# Patient Record
Sex: FEMALE | Race: WHITE | NOT HISPANIC OR LATINO | Employment: OTHER | URBAN - NONMETROPOLITAN AREA
[De-identification: names, ages, dates, MRNs, and addresses within clinical notes are randomized per-mention and may not be internally consistent; named-entity substitution may affect disease eponyms.]

---

## 2017-03-23 ENCOUNTER — TELEPHONE (OUTPATIENT)
Dept: CARDIAC SURGERY | Facility: CLINIC | Age: 72
End: 2017-03-23

## 2020-11-18 ENCOUNTER — OUTSIDE FACILITY SERVICE (OUTPATIENT)
Dept: CARDIOLOGY | Facility: CLINIC | Age: 75
End: 2020-11-18

## 2020-11-18 PROCEDURE — 93010 ELECTROCARDIOGRAM REPORT: CPT | Performed by: INTERNAL MEDICINE

## 2021-10-13 ENCOUNTER — APPOINTMENT (OUTPATIENT)
Dept: GENERAL RADIOLOGY | Age: 76
DRG: 493 | End: 2021-10-13
Payer: MEDICARE

## 2021-10-13 ENCOUNTER — ANESTHESIA EVENT (OUTPATIENT)
Dept: OPERATING ROOM | Age: 76
DRG: 493 | End: 2021-10-13
Payer: MEDICARE

## 2021-10-13 ENCOUNTER — ANESTHESIA (OUTPATIENT)
Dept: OPERATING ROOM | Age: 76
DRG: 493 | End: 2021-10-13
Payer: MEDICARE

## 2021-10-13 ENCOUNTER — HOSPITAL ENCOUNTER (INPATIENT)
Age: 76
LOS: 1 days | Discharge: HOME | DRG: 493 | End: 2021-10-14
Attending: PEDIATRICS | Admitting: HOSPITALIST
Payer: MEDICARE

## 2021-10-13 VITALS — TEMPERATURE: 98.2 F | OXYGEN SATURATION: 100 % | SYSTOLIC BLOOD PRESSURE: 130 MMHG | DIASTOLIC BLOOD PRESSURE: 59 MMHG
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
## 2021-10-13 DIAGNOSIS — S82.202B TYPE I OR II OPEN FRACTURE OF LEFT TIBIA AND FIBULA, INITIAL ENCOUNTER: Primary | ICD-10-CM

## 2021-10-13 DIAGNOSIS — S82.402B TYPE I OR II OPEN FRACTURE OF LEFT TIBIA AND FIBULA, INITIAL ENCOUNTER: Primary | ICD-10-CM

## 2021-10-13 DIAGNOSIS — F31.9 BIPOLAR AFFECTIVE DISORDER, REMISSION STATUS UNSPECIFIED (HCC): ICD-10-CM

## 2021-10-13 DIAGNOSIS — N39.0 ACUTE URINARY TRACT INFECTION: ICD-10-CM

## 2021-10-13 PROBLEM — S82.832B: Status: ACTIVE | Noted: 2021-10-13

## 2021-10-13 PROBLEM — E66.9 OBESITY: Status: ACTIVE | Noted: 2021-10-13

## 2021-10-13 PROBLEM — S82.302B: Status: ACTIVE | Noted: 2021-10-13

## 2021-10-13 PROBLEM — I50.32 CHRONIC DIASTOLIC HF (HEART FAILURE) (HCC): Status: ACTIVE | Noted: 2021-10-13

## 2021-10-13 PROBLEM — I10 ESSENTIAL HYPERTENSION: Status: ACTIVE | Noted: 2021-10-13

## 2021-10-13 PROBLEM — G47.33 OBSTRUCTIVE SLEEP APNEA OF ADULT: Status: ACTIVE | Noted: 2021-10-13

## 2021-10-13 PROBLEM — J44.9 CHRONIC OBSTRUCTIVE LUNG DISEASE (HCC): Status: ACTIVE | Noted: 2021-10-13

## 2021-10-13 PROBLEM — N18.9 CKD (CHRONIC KIDNEY DISEASE): Status: ACTIVE | Noted: 2021-10-13

## 2021-10-13 LAB
ABO/RH: NORMAL
ALBUMIN SERPL-MCNC: 4 G/DL (ref 3.5–5.2)
ALP BLD-CCNC: 113 U/L (ref 35–104)
ALT SERPL-CCNC: 7 U/L (ref 5–33)
ANION GAP SERPL CALCULATED.3IONS-SCNC: 7 MMOL/L (ref 7–19)
ANION GAP SERPL CALCULATED.3IONS-SCNC: 9 MMOL/L (ref 7–19)
ANTIBODY SCREEN: NORMAL
APTT: 27.5 SEC (ref 26–36.2)
AST SERPL-CCNC: 12 U/L (ref 5–32)
BACTERIA: ABNORMAL /HPF
BASOPHILS ABSOLUTE: 0 K/UL (ref 0–0.2)
BASOPHILS RELATIVE PERCENT: 0.5 % (ref 0–1)
BILIRUB SERPL-MCNC: <0.2 MG/DL (ref 0.2–1.2)
BILIRUBIN URINE: NEGATIVE
BLOOD, URINE: NEGATIVE
BUN BLDV-MCNC: 27 MG/DL (ref 8–23)
BUN BLDV-MCNC: 27 MG/DL (ref 8–23)
CALCIUM SERPL-MCNC: 9.1 MG/DL (ref 8.8–10.2)
CALCIUM SERPL-MCNC: 9.1 MG/DL (ref 8.8–10.2)
CHLORIDE BLD-SCNC: 104 MMOL/L (ref 98–111)
CHLORIDE BLD-SCNC: 107 MMOL/L (ref 98–111)
CLARITY: ABNORMAL
CO2: 27 MMOL/L (ref 22–29)
CO2: 28 MMOL/L (ref 22–29)
COLOR: YELLOW
CREAT SERPL-MCNC: 1.6 MG/DL (ref 0.5–0.9)
CREAT SERPL-MCNC: 1.7 MG/DL (ref 0.5–0.9)
EOSINOPHILS ABSOLUTE: 0.3 K/UL (ref 0–0.6)
EOSINOPHILS RELATIVE PERCENT: 4.4 % (ref 0–5)
EPITHELIAL CELLS, UA: ABNORMAL /HPF
GFR AFRICAN AMERICAN: 36
GFR AFRICAN AMERICAN: 38
GFR NON-AFRICAN AMERICAN: 29
GFR NON-AFRICAN AMERICAN: 31
GLUCOSE BLD-MCNC: 110 MG/DL (ref 70–99)
GLUCOSE BLD-MCNC: 120 MG/DL (ref 74–109)
GLUCOSE BLD-MCNC: 136 MG/DL (ref 74–109)
GLUCOSE BLD-MCNC: 162 MG/DL (ref 70–99)
GLUCOSE BLD-MCNC: 273 MG/DL (ref 70–99)
GLUCOSE URINE: NEGATIVE MG/DL
HBA1C MFR BLD: 5.7 % (ref 4–6)
HCT VFR BLD CALC: 38.1 % (ref 37–47)
HEMOGLOBIN: 11.4 G/DL (ref 12–16)
IMMATURE GRANULOCYTES #: 0 K/UL
INR BLD: 0.94 (ref 0.88–1.18)
KETONES, URINE: ABNORMAL MG/DL
LEUKOCYTE ESTERASE, URINE: ABNORMAL
LYMPHOCYTES ABSOLUTE: 1.9 K/UL (ref 1.1–4.5)
LYMPHOCYTES RELATIVE PERCENT: 32.5 % (ref 20–40)
MCH RBC QN AUTO: 28.7 PG (ref 27–31)
MCHC RBC AUTO-ENTMCNC: 29.9 G/DL (ref 33–37)
MCV RBC AUTO: 96 FL (ref 81–99)
MONOCYTES ABSOLUTE: 0.5 K/UL (ref 0–0.9)
MONOCYTES RELATIVE PERCENT: 8.1 % (ref 0–10)
NEUTROPHILS ABSOLUTE: 3.2 K/UL (ref 1.5–7.5)
NEUTROPHILS RELATIVE PERCENT: 53.8 % (ref 50–65)
NITRITE, URINE: POSITIVE
PDW BLD-RTO: 13 % (ref 11.5–14.5)
PERFORMED ON: ABNORMAL
PH UA: 5 (ref 5–8)
PLATELET # BLD: 211 K/UL (ref 130–400)
PMV BLD AUTO: 9.4 FL (ref 9.4–12.3)
POTASSIUM REFLEX MAGNESIUM: 4.4 MMOL/L (ref 3.5–5)
POTASSIUM REFLEX MAGNESIUM: 4.5 MMOL/L (ref 3.5–5)
PROTEIN UA: ABNORMAL MG/DL
PROTHROMBIN TIME: 12.8 SEC (ref 12–14.6)
RBC # BLD: 3.97 M/UL (ref 4.2–5.4)
RBC UA: ABNORMAL /HPF (ref 0–2)
SARS-COV-2, NAAT: NOT DETECTED
SODIUM BLD-SCNC: 139 MMOL/L (ref 136–145)
SODIUM BLD-SCNC: 143 MMOL/L (ref 136–145)
SPECIFIC GRAVITY UA: 1.03 (ref 1–1.03)
TOTAL PROTEIN: 7.3 G/DL (ref 6.6–8.7)
TROPONIN: <0.01 NG/ML (ref 0–0.03)
UROBILINOGEN, URINE: 1 E.U./DL
VITAMIN D 25-HYDROXY: 66.6 NG/ML
WBC # BLD: 5.9 K/UL (ref 4.8–10.8)
WBC UA: ABNORMAL /HPF (ref 0–5)

## 2021-10-13 PROCEDURE — 99285 EMERGENCY DEPT VISIT HI MDM: CPT

## 2021-10-13 PROCEDURE — 64445 NJX AA&/STRD SCIATIC NRV IMG: CPT

## 2021-10-13 PROCEDURE — C1713 ANCHOR/SCREW BN/BN,TIS/BN: HCPCS | Performed by: ORTHOPAEDIC SURGERY

## 2021-10-13 PROCEDURE — 85610 PROTHROMBIN TIME: CPT

## 2021-10-13 PROCEDURE — 2580000003 HC RX 258: Performed by: HOSPITALIST

## 2021-10-13 PROCEDURE — 73600 X-RAY EXAM OF ANKLE: CPT

## 2021-10-13 PROCEDURE — 2580000003 HC RX 258

## 2021-10-13 PROCEDURE — 6360000002 HC RX W HCPCS: Performed by: PEDIATRICS

## 2021-10-13 PROCEDURE — 86901 BLOOD TYPING SEROLOGIC RH(D): CPT

## 2021-10-13 PROCEDURE — 82947 ASSAY GLUCOSE BLOOD QUANT: CPT

## 2021-10-13 PROCEDURE — 64447 NJX AA&/STRD FEMORAL NRV IMG: CPT

## 2021-10-13 PROCEDURE — 0QSH04Z REPOSITION LEFT TIBIA WITH INTERNAL FIXATION DEVICE, OPEN APPROACH: ICD-10-PCS | Performed by: ORTHOPAEDIC SURGERY

## 2021-10-13 PROCEDURE — 73610 X-RAY EXAM OF ANKLE: CPT

## 2021-10-13 PROCEDURE — 87186 SC STD MICRODIL/AGAR DIL: CPT

## 2021-10-13 PROCEDURE — 2500000003 HC RX 250 WO HCPCS: Performed by: PEDIATRICS

## 2021-10-13 PROCEDURE — 6360000002 HC RX W HCPCS: Performed by: ORTHOPAEDIC SURGERY

## 2021-10-13 PROCEDURE — 73630 X-RAY EXAM OF FOOT: CPT

## 2021-10-13 PROCEDURE — 7100000000 HC PACU RECOVERY - FIRST 15 MIN: Performed by: ORTHOPAEDIC SURGERY

## 2021-10-13 PROCEDURE — C1769 GUIDE WIRE: HCPCS | Performed by: ORTHOPAEDIC SURGERY

## 2021-10-13 PROCEDURE — 6360000002 HC RX W HCPCS

## 2021-10-13 PROCEDURE — 3700000001 HC ADD 15 MINUTES (ANESTHESIA): Performed by: ORTHOPAEDIC SURGERY

## 2021-10-13 PROCEDURE — 93005 ELECTROCARDIOGRAM TRACING: CPT | Performed by: PEDIATRICS

## 2021-10-13 PROCEDURE — 6360000002 HC RX W HCPCS: Performed by: ANESTHESIOLOGY

## 2021-10-13 PROCEDURE — 2500000003 HC RX 250 WO HCPCS

## 2021-10-13 PROCEDURE — 84484 ASSAY OF TROPONIN QUANT: CPT

## 2021-10-13 PROCEDURE — 90471 IMMUNIZATION ADMIN: CPT | Performed by: PEDIATRICS

## 2021-10-13 PROCEDURE — 82306 VITAMIN D 25 HYDROXY: CPT

## 2021-10-13 PROCEDURE — 87635 SARS-COV-2 COVID-19 AMP PRB: CPT

## 2021-10-13 PROCEDURE — 94640 AIRWAY INHALATION TREATMENT: CPT

## 2021-10-13 PROCEDURE — 2580000003 HC RX 258: Performed by: ORTHOPAEDIC SURGERY

## 2021-10-13 PROCEDURE — 6370000000 HC RX 637 (ALT 250 FOR IP): Performed by: ORTHOPAEDIC SURGERY

## 2021-10-13 PROCEDURE — 96374 THER/PROPH/DIAG INJ IV PUSH: CPT

## 2021-10-13 PROCEDURE — 2720000010 HC SURG SUPPLY STERILE: Performed by: ORTHOPAEDIC SURGERY

## 2021-10-13 PROCEDURE — 36415 COLL VENOUS BLD VENIPUNCTURE: CPT

## 2021-10-13 PROCEDURE — 87040 BLOOD CULTURE FOR BACTERIA: CPT

## 2021-10-13 PROCEDURE — 3600000014 HC SURGERY LEVEL 4 ADDTL 15MIN: Performed by: ORTHOPAEDIC SURGERY

## 2021-10-13 PROCEDURE — 2580000003 HC RX 258: Performed by: PEDIATRICS

## 2021-10-13 PROCEDURE — 81001 URINALYSIS AUTO W/SCOPE: CPT

## 2021-10-13 PROCEDURE — 96375 TX/PRO/DX INJ NEW DRUG ADDON: CPT

## 2021-10-13 PROCEDURE — 83036 HEMOGLOBIN GLYCOSYLATED A1C: CPT

## 2021-10-13 PROCEDURE — 80053 COMPREHEN METABOLIC PANEL: CPT

## 2021-10-13 PROCEDURE — 99152 MOD SED SAME PHYS/QHP 5/>YRS: CPT

## 2021-10-13 PROCEDURE — 27752 TREATMENT OF TIBIA FRACTURE: CPT

## 2021-10-13 PROCEDURE — 2709999900 HC NON-CHARGEABLE SUPPLY: Performed by: ORTHOPAEDIC SURGERY

## 2021-10-13 PROCEDURE — 86850 RBC ANTIBODY SCREEN: CPT

## 2021-10-13 PROCEDURE — 87086 URINE CULTURE/COLONY COUNT: CPT

## 2021-10-13 PROCEDURE — 71045 X-RAY EXAM CHEST 1 VIEW: CPT

## 2021-10-13 PROCEDURE — 3E0T3BZ INTRODUCTION OF ANESTHETIC AGENT INTO PERIPHERAL NERVES AND PLEXI, PERCUTANEOUS APPROACH: ICD-10-PCS | Performed by: ORTHOPAEDIC SURGERY

## 2021-10-13 PROCEDURE — 85730 THROMBOPLASTIN TIME PARTIAL: CPT

## 2021-10-13 PROCEDURE — 6360000002 HC RX W HCPCS: Performed by: HOSPITALIST

## 2021-10-13 PROCEDURE — 3600000004 HC SURGERY LEVEL 4 BASE: Performed by: ORTHOPAEDIC SURGERY

## 2021-10-13 PROCEDURE — 6360000002 HC RX W HCPCS: Performed by: STUDENT IN AN ORGANIZED HEALTH CARE EDUCATION/TRAINING PROGRAM

## 2021-10-13 PROCEDURE — 0QSK04Z REPOSITION LEFT FIBULA WITH INTERNAL FIXATION DEVICE, OPEN APPROACH: ICD-10-PCS | Performed by: ORTHOPAEDIC SURGERY

## 2021-10-13 PROCEDURE — 2700000000 HC OXYGEN THERAPY PER DAY

## 2021-10-13 PROCEDURE — 2580000003 HC RX 258: Performed by: STUDENT IN AN ORGANIZED HEALTH CARE EDUCATION/TRAINING PROGRAM

## 2021-10-13 PROCEDURE — 85025 COMPLETE CBC W/AUTO DIFF WBC: CPT

## 2021-10-13 PROCEDURE — 6370000000 HC RX 637 (ALT 250 FOR IP): Performed by: HOSPITALIST

## 2021-10-13 PROCEDURE — 3209999900 FLUORO FOR SURGICAL PROCEDURES

## 2021-10-13 PROCEDURE — 86900 BLOOD TYPING SEROLOGIC ABO: CPT

## 2021-10-13 PROCEDURE — 3700000000 HC ANESTHESIA ATTENDED CARE: Performed by: ORTHOPAEDIC SURGERY

## 2021-10-13 PROCEDURE — 7100000001 HC PACU RECOVERY - ADDTL 15 MIN: Performed by: ORTHOPAEDIC SURGERY

## 2021-10-13 PROCEDURE — 90715 TDAP VACCINE 7 YRS/> IM: CPT | Performed by: PEDIATRICS

## 2021-10-13 PROCEDURE — 51702 INSERT TEMP BLADDER CATH: CPT

## 2021-10-13 PROCEDURE — 1210000000 HC MED SURG R&B

## 2021-10-13 DEVICE — SCREW BNE L16MM DIA3.5MM CORT S STL ST NONCANNULATED LOK: Type: IMPLANTABLE DEVICE | Site: ANKLE | Status: FUNCTIONAL

## 2021-10-13 DEVICE — SCREW BNE L40MM DIA4MM S STL CANN SHT 1/3 THRD SM HEX SOCK: Type: IMPLANTABLE DEVICE | Site: ANKLE | Status: FUNCTIONAL

## 2021-10-13 DEVICE — PLATE BNE L117MM 10 H S STL 1/3 TBLR LOK COMPR W/ CLLR FOR: Type: IMPLANTABLE DEVICE | Site: ANKLE | Status: FUNCTIONAL

## 2021-10-13 DEVICE — IMPLANTABLE DEVICE: Type: IMPLANTABLE DEVICE | Site: ANKLE | Status: FUNCTIONAL

## 2021-10-13 DEVICE — SCREW BNE L14MM DIA3.5MM CORT S STL ST LOK FULL THRD: Type: IMPLANTABLE DEVICE | Site: ANKLE | Status: FUNCTIONAL

## 2021-10-13 DEVICE — SCREW BNE L14MM DIA3.5MM CORT S STL ST NONCANNULATED LOK: Type: IMPLANTABLE DEVICE | Site: ANKLE | Status: FUNCTIONAL

## 2021-10-13 RX ORDER — LAMOTRIGINE 200 MG/1
200 TABLET ORAL 2 TIMES DAILY
COMMUNITY

## 2021-10-13 RX ORDER — ROPIVACAINE HYDROCHLORIDE 5 MG/ML
INJECTION, SOLUTION EPIDURAL; INFILTRATION; PERINEURAL
Status: COMPLETED
Start: 2021-10-13 | End: 2021-10-13

## 2021-10-13 RX ORDER — ESCITALOPRAM OXALATE 10 MG/1
30 TABLET ORAL DAILY
Status: DISCONTINUED | OUTPATIENT
Start: 2021-10-13 | End: 2021-10-14 | Stop reason: HOSPADM

## 2021-10-13 RX ORDER — ANTIARTHRITIC COMBINATION NO.2 900 MG
TABLET ORAL DAILY
COMMUNITY

## 2021-10-13 RX ORDER — DEXTROSE MONOHYDRATE, SODIUM CHLORIDE, AND POTASSIUM CHLORIDE 50; 1.49; 4.5 G/1000ML; G/1000ML; G/1000ML
INJECTION, SOLUTION INTRAVENOUS CONTINUOUS
Status: DISCONTINUED | OUTPATIENT
Start: 2021-10-13 | End: 2021-10-13

## 2021-10-13 RX ORDER — DEXTROSE MONOHYDRATE 25 G/50ML
12.5 INJECTION, SOLUTION INTRAVENOUS PRN
Status: DISCONTINUED | OUTPATIENT
Start: 2021-10-13 | End: 2021-10-14 | Stop reason: HOSPADM

## 2021-10-13 RX ORDER — ONDANSETRON 2 MG/ML
4 INJECTION INTRAMUSCULAR; INTRAVENOUS EVERY 6 HOURS PRN
Status: DISCONTINUED | OUTPATIENT
Start: 2021-10-13 | End: 2021-10-14 | Stop reason: HOSPADM

## 2021-10-13 RX ORDER — HYDROMORPHONE HYDROCHLORIDE 1 MG/ML
2 INJECTION, SOLUTION INTRAMUSCULAR; INTRAVENOUS; SUBCUTANEOUS
Status: DISCONTINUED | OUTPATIENT
Start: 2021-10-13 | End: 2021-10-14 | Stop reason: HOSPADM

## 2021-10-13 RX ORDER — NITROGLYCERIN 20 MG/100ML
INJECTION INTRAVENOUS PRN
Status: DISCONTINUED | OUTPATIENT
Start: 2021-10-13 | End: 2021-10-13 | Stop reason: SDUPTHER

## 2021-10-13 RX ORDER — CLONAZEPAM 0.5 MG/1
0.5 TABLET ORAL NIGHTLY
Status: ON HOLD | COMMUNITY
End: 2021-10-14 | Stop reason: SDUPTHER

## 2021-10-13 RX ORDER — SODIUM CHLORIDE 9 MG/ML
25 INJECTION, SOLUTION INTRAVENOUS PRN
Status: DISCONTINUED | OUTPATIENT
Start: 2021-10-13 | End: 2021-10-13

## 2021-10-13 RX ORDER — FENTANYL CITRATE 50 UG/ML
INJECTION, SOLUTION INTRAMUSCULAR; INTRAVENOUS PRN
Status: DISCONTINUED | OUTPATIENT
Start: 2021-10-13 | End: 2021-10-13 | Stop reason: SDUPTHER

## 2021-10-13 RX ORDER — MAGNESIUM OXIDE 400 MG/1
400 TABLET ORAL DAILY
COMMUNITY

## 2021-10-13 RX ORDER — DEXTROSE MONOHYDRATE 50 MG/ML
100 INJECTION, SOLUTION INTRAVENOUS PRN
Status: DISCONTINUED | OUTPATIENT
Start: 2021-10-13 | End: 2021-10-14 | Stop reason: HOSPADM

## 2021-10-13 RX ORDER — SODIUM CHLORIDE 0.9 % (FLUSH) 0.9 %
5-40 SYRINGE (ML) INJECTION PRN
Status: DISCONTINUED | OUTPATIENT
Start: 2021-10-13 | End: 2021-10-14 | Stop reason: HOSPADM

## 2021-10-13 RX ORDER — BUDESONIDE 0.5 MG/2ML
0.5 INHALANT ORAL
Status: DISCONTINUED | OUTPATIENT
Start: 2021-10-13 | End: 2021-10-14 | Stop reason: HOSPADM

## 2021-10-13 RX ORDER — SODIUM CHLORIDE 9 MG/ML
INJECTION, SOLUTION INTRAVENOUS CONTINUOUS
Status: DISCONTINUED | OUTPATIENT
Start: 2021-10-13 | End: 2021-10-13

## 2021-10-13 RX ORDER — ONDANSETRON 2 MG/ML
4 INJECTION INTRAMUSCULAR; INTRAVENOUS
Status: DISCONTINUED | OUTPATIENT
Start: 2021-10-13 | End: 2021-10-13 | Stop reason: HOSPADM

## 2021-10-13 RX ORDER — MORPHINE SULFATE 2 MG/ML
2 INJECTION, SOLUTION INTRAMUSCULAR; INTRAVENOUS
Status: DISCONTINUED | OUTPATIENT
Start: 2021-10-13 | End: 2021-10-13

## 2021-10-13 RX ORDER — POTASSIUM CHLORIDE 20 MEQ/1
20 TABLET, EXTENDED RELEASE ORAL 2 TIMES DAILY
Status: DISCONTINUED | OUTPATIENT
Start: 2021-10-13 | End: 2021-10-14 | Stop reason: HOSPADM

## 2021-10-13 RX ORDER — CALCIUM CARBONATE 500 MG/1
500 TABLET, CHEWABLE ORAL 3 TIMES DAILY PRN
Status: DISCONTINUED | OUTPATIENT
Start: 2021-10-13 | End: 2021-10-14 | Stop reason: HOSPADM

## 2021-10-13 RX ORDER — ONDANSETRON 2 MG/ML
INJECTION INTRAMUSCULAR; INTRAVENOUS PRN
Status: DISCONTINUED | OUTPATIENT
Start: 2021-10-13 | End: 2021-10-13 | Stop reason: SDUPTHER

## 2021-10-13 RX ORDER — TOPIRAMATE 50 MG/1
50 TABLET, FILM COATED ORAL 2 TIMES DAILY
COMMUNITY

## 2021-10-13 RX ORDER — HYDROMORPHONE HYDROCHLORIDE 1 MG/ML
0.25 INJECTION, SOLUTION INTRAMUSCULAR; INTRAVENOUS; SUBCUTANEOUS EVERY 5 MIN PRN
Status: DISCONTINUED | OUTPATIENT
Start: 2021-10-13 | End: 2021-10-13 | Stop reason: HOSPADM

## 2021-10-13 RX ORDER — PANTOPRAZOLE SODIUM 40 MG/1
40 TABLET, DELAYED RELEASE ORAL 2 TIMES DAILY
Status: DISCONTINUED | OUTPATIENT
Start: 2021-10-13 | End: 2021-10-14 | Stop reason: HOSPADM

## 2021-10-13 RX ORDER — ANTIARTHRITIC COMBINATION NO.2 900 MG
1 TABLET ORAL DAILY
Status: DISCONTINUED | OUTPATIENT
Start: 2021-10-13 | End: 2021-10-13

## 2021-10-13 RX ORDER — TRAMADOL HYDROCHLORIDE 50 MG/1
50 TABLET ORAL EVERY 6 HOURS
Status: DISCONTINUED | OUTPATIENT
Start: 2021-10-13 | End: 2021-10-14 | Stop reason: HOSPADM

## 2021-10-13 RX ORDER — LAMOTRIGINE 200 MG/1
200 TABLET ORAL 2 TIMES DAILY
Status: DISCONTINUED | OUTPATIENT
Start: 2021-10-13 | End: 2021-10-14 | Stop reason: HOSPADM

## 2021-10-13 RX ORDER — ROPIVACAINE HYDROCHLORIDE 5 MG/ML
INJECTION, SOLUTION EPIDURAL; INFILTRATION; PERINEURAL
Status: COMPLETED | OUTPATIENT
Start: 2021-10-13 | End: 2021-10-13

## 2021-10-13 RX ORDER — MORPHINE SULFATE 2 MG/ML
1 INJECTION, SOLUTION INTRAMUSCULAR; INTRAVENOUS
Status: DISCONTINUED | OUTPATIENT
Start: 2021-10-13 | End: 2021-10-13

## 2021-10-13 RX ORDER — MECOBALAMIN 5000 MCG
5 TABLET,DISINTEGRATING ORAL NIGHTLY PRN
Status: DISCONTINUED | OUTPATIENT
Start: 2021-10-13 | End: 2021-10-13

## 2021-10-13 RX ORDER — ALBUTEROL SULFATE 2.5 MG/3ML
2.5 SOLUTION RESPIRATORY (INHALATION)
Status: DISCONTINUED | OUTPATIENT
Start: 2021-10-13 | End: 2021-10-14 | Stop reason: HOSPADM

## 2021-10-13 RX ORDER — DEXAMETHASONE SODIUM PHOSPHATE 4 MG/ML
INJECTION, SOLUTION INTRA-ARTICULAR; INTRALESIONAL; INTRAMUSCULAR; INTRAVENOUS; SOFT TISSUE PRN
Status: DISCONTINUED | OUTPATIENT
Start: 2021-10-13 | End: 2021-10-13 | Stop reason: SDUPTHER

## 2021-10-13 RX ORDER — SODIUM CHLORIDE 0.9 % (FLUSH) 0.9 %
10 SYRINGE (ML) INJECTION PRN
Status: DISCONTINUED | OUTPATIENT
Start: 2021-10-13 | End: 2021-10-13 | Stop reason: HOSPADM

## 2021-10-13 RX ORDER — ONDANSETRON 4 MG/1
4 TABLET, ORALLY DISINTEGRATING ORAL EVERY 8 HOURS PRN
Status: DISCONTINUED | OUTPATIENT
Start: 2021-10-13 | End: 2021-10-14 | Stop reason: HOSPADM

## 2021-10-13 RX ORDER — FENTANYL CITRATE 50 UG/ML
50 INJECTION, SOLUTION INTRAMUSCULAR; INTRAVENOUS EVERY 5 MIN PRN
Status: DISCONTINUED | OUTPATIENT
Start: 2021-10-13 | End: 2021-10-13 | Stop reason: HOSPADM

## 2021-10-13 RX ORDER — METOPROLOL TARTRATE 50 MG/1
25 TABLET, FILM COATED ORAL 2 TIMES DAILY
Status: DISCONTINUED | OUTPATIENT
Start: 2021-10-13 | End: 2021-10-14 | Stop reason: HOSPADM

## 2021-10-13 RX ORDER — SODIUM CHLORIDE 9 MG/ML
25 INJECTION, SOLUTION INTRAVENOUS PRN
Status: DISCONTINUED | OUTPATIENT
Start: 2021-10-13 | End: 2021-10-13 | Stop reason: HOSPADM

## 2021-10-13 RX ORDER — ONDANSETRON 2 MG/ML
4 INJECTION INTRAMUSCULAR; INTRAVENOUS ONCE
Status: COMPLETED | OUTPATIENT
Start: 2021-10-13 | End: 2021-10-13

## 2021-10-13 RX ORDER — TOPIRAMATE 50 MG/1
50 TABLET, FILM COATED ORAL 2 TIMES DAILY
Status: DISCONTINUED | OUTPATIENT
Start: 2021-10-13 | End: 2021-10-14 | Stop reason: HOSPADM

## 2021-10-13 RX ORDER — MORPHINE SULFATE 2 MG/ML
1 INJECTION, SOLUTION INTRAMUSCULAR; INTRAVENOUS EVERY 4 HOURS PRN
Status: DISCONTINUED | OUTPATIENT
Start: 2021-10-13 | End: 2021-10-13

## 2021-10-13 RX ORDER — ALBUTEROL SULFATE 90 UG/1
INHALANT RESPIRATORY (INHALATION)
COMMUNITY

## 2021-10-13 RX ORDER — SODIUM CHLORIDE 0.9 % (FLUSH) 0.9 %
5-40 SYRINGE (ML) INJECTION EVERY 12 HOURS SCHEDULED
Status: DISCONTINUED | OUTPATIENT
Start: 2021-10-13 | End: 2021-10-14 | Stop reason: HOSPADM

## 2021-10-13 RX ORDER — POTASSIUM CHLORIDE 1500 MG/1
20 TABLET, EXTENDED RELEASE ORAL 2 TIMES DAILY
COMMUNITY

## 2021-10-13 RX ORDER — LANOLIN ALCOHOL/MO/W.PET/CERES
400 CREAM (GRAM) TOPICAL DAILY
Status: DISCONTINUED | OUTPATIENT
Start: 2021-10-13 | End: 2021-10-14 | Stop reason: HOSPADM

## 2021-10-13 RX ORDER — TAMSULOSIN HYDROCHLORIDE 0.4 MG/1
0.4 CAPSULE ORAL DAILY
COMMUNITY

## 2021-10-13 RX ORDER — CALCIUM CARBONATE 500 MG/1
500 TABLET, CHEWABLE ORAL DAILY
Status: DISCONTINUED | OUTPATIENT
Start: 2021-10-13 | End: 2021-10-14 | Stop reason: HOSPADM

## 2021-10-13 RX ORDER — HYDROMORPHONE HYDROCHLORIDE 1 MG/ML
0.5 INJECTION, SOLUTION INTRAMUSCULAR; INTRAVENOUS; SUBCUTANEOUS
Status: DISCONTINUED | OUTPATIENT
Start: 2021-10-13 | End: 2021-10-14 | Stop reason: HOSPADM

## 2021-10-13 RX ORDER — HYDROMORPHONE HYDROCHLORIDE 1 MG/ML
1 INJECTION, SOLUTION INTRAMUSCULAR; INTRAVENOUS; SUBCUTANEOUS
Status: DISCONTINUED | OUTPATIENT
Start: 2021-10-13 | End: 2021-10-14 | Stop reason: HOSPADM

## 2021-10-13 RX ORDER — CEFAZOLIN SODIUM 1 G/3ML
INJECTION, POWDER, FOR SOLUTION INTRAMUSCULAR; INTRAVENOUS PRN
Status: DISCONTINUED | OUTPATIENT
Start: 2021-10-13 | End: 2021-10-13 | Stop reason: SDUPTHER

## 2021-10-13 RX ORDER — PANTOPRAZOLE SODIUM 40 MG/1
40 TABLET, DELAYED RELEASE ORAL 2 TIMES DAILY
COMMUNITY

## 2021-10-13 RX ORDER — FENTANYL CITRATE 50 UG/ML
INJECTION, SOLUTION INTRAMUSCULAR; INTRAVENOUS
Status: COMPLETED
Start: 2021-10-13 | End: 2021-10-13

## 2021-10-13 RX ORDER — SODIUM CHLORIDE, SODIUM LACTATE, POTASSIUM CHLORIDE, CALCIUM CHLORIDE 600; 310; 30; 20 MG/100ML; MG/100ML; MG/100ML; MG/100ML
INJECTION, SOLUTION INTRAVENOUS CONTINUOUS
Status: DISCONTINUED | OUTPATIENT
Start: 2021-10-13 | End: 2021-10-13

## 2021-10-13 RX ORDER — MECOBALAMIN 5000 MCG
5 TABLET,DISINTEGRATING ORAL NIGHTLY PRN
Status: DISCONTINUED | OUTPATIENT
Start: 2021-10-13 | End: 2021-10-14 | Stop reason: HOSPADM

## 2021-10-13 RX ORDER — POLYETHYLENE GLYCOL 3350 17 G/17G
17 POWDER, FOR SOLUTION ORAL DAILY PRN
COMMUNITY

## 2021-10-13 RX ORDER — VITAMIN B COMPLEX
2000 TABLET ORAL DAILY
Status: DISCONTINUED | OUTPATIENT
Start: 2021-10-13 | End: 2021-10-14 | Stop reason: HOSPADM

## 2021-10-13 RX ORDER — DOCUSATE SODIUM 100 MG/1
100 CAPSULE, LIQUID FILLED ORAL DAILY
COMMUNITY

## 2021-10-13 RX ORDER — NITROGLYCERIN 0.4 MG/1
0.4 TABLET SUBLINGUAL EVERY 5 MIN PRN
COMMUNITY

## 2021-10-13 RX ORDER — SODIUM CHLORIDE 0.9 % (FLUSH) 0.9 %
10 SYRINGE (ML) INJECTION EVERY 12 HOURS SCHEDULED
Status: DISCONTINUED | OUTPATIENT
Start: 2021-10-13 | End: 2021-10-13 | Stop reason: HOSPADM

## 2021-10-13 RX ORDER — OXYCODONE HYDROCHLORIDE 5 MG/1
10 TABLET ORAL EVERY 4 HOURS PRN
Status: DISCONTINUED | OUTPATIENT
Start: 2021-10-13 | End: 2021-10-14 | Stop reason: HOSPADM

## 2021-10-13 RX ORDER — SODIUM CHLORIDE, SODIUM LACTATE, POTASSIUM CHLORIDE, CALCIUM CHLORIDE 600; 310; 30; 20 MG/100ML; MG/100ML; MG/100ML; MG/100ML
INJECTION, SOLUTION INTRAVENOUS CONTINUOUS PRN
Status: DISCONTINUED | OUTPATIENT
Start: 2021-10-13 | End: 2021-10-13 | Stop reason: SDUPTHER

## 2021-10-13 RX ORDER — FENTANYL CITRATE 50 UG/ML
100 INJECTION, SOLUTION INTRAMUSCULAR; INTRAVENOUS ONCE
Status: COMPLETED | OUTPATIENT
Start: 2021-10-13 | End: 2021-10-13

## 2021-10-13 RX ORDER — DOCUSATE SODIUM 100 MG/1
100 CAPSULE, LIQUID FILLED ORAL DAILY
Status: DISCONTINUED | OUTPATIENT
Start: 2021-10-13 | End: 2021-10-14 | Stop reason: HOSPADM

## 2021-10-13 RX ORDER — ARFORMOTEROL TARTRATE 15 UG/2ML
15 SOLUTION RESPIRATORY (INHALATION)
Status: DISCONTINUED | OUTPATIENT
Start: 2021-10-13 | End: 2021-10-14 | Stop reason: HOSPADM

## 2021-10-13 RX ORDER — TAMSULOSIN HYDROCHLORIDE 0.4 MG/1
0.4 CAPSULE ORAL DAILY
Status: DISCONTINUED | OUTPATIENT
Start: 2021-10-13 | End: 2021-10-14 | Stop reason: HOSPADM

## 2021-10-13 RX ORDER — CALCIUM CARBONATE 500 MG/1
1 TABLET, CHEWABLE ORAL EVERY 6 HOURS PRN
COMMUNITY

## 2021-10-13 RX ORDER — ACETAMINOPHEN 325 MG/1
650 TABLET ORAL EVERY 4 HOURS PRN
COMMUNITY

## 2021-10-13 RX ORDER — BUMETANIDE 1 MG/1
1.5 TABLET ORAL DAILY
COMMUNITY

## 2021-10-13 RX ORDER — ZOLPIDEM TARTRATE 10 MG/1
TABLET ORAL NIGHTLY
Status: ON HOLD | COMMUNITY
End: 2021-10-14 | Stop reason: HOSPADM

## 2021-10-13 RX ORDER — POLYETHYLENE GLYCOL 3350 17 G/17G
17 POWDER, FOR SOLUTION ORAL DAILY PRN
Status: DISCONTINUED | OUTPATIENT
Start: 2021-10-13 | End: 2021-10-14 | Stop reason: HOSPADM

## 2021-10-13 RX ORDER — ROCURONIUM BROMIDE 10 MG/ML
INJECTION, SOLUTION INTRAVENOUS PRN
Status: DISCONTINUED | OUTPATIENT
Start: 2021-10-13 | End: 2021-10-13 | Stop reason: SDUPTHER

## 2021-10-13 RX ORDER — NICOTINE POLACRILEX 4 MG
15 LOZENGE BUCCAL PRN
Status: DISCONTINUED | OUTPATIENT
Start: 2021-10-13 | End: 2021-10-14 | Stop reason: HOSPADM

## 2021-10-13 RX ORDER — PROPOFOL 10 MG/ML
INJECTION, EMULSION INTRAVENOUS PRN
Status: DISCONTINUED | OUTPATIENT
Start: 2021-10-13 | End: 2021-10-13 | Stop reason: SDUPTHER

## 2021-10-13 RX ORDER — ERGOCALCIFEROL 1.25 MG/1
50000 CAPSULE ORAL WEEKLY
Status: ON HOLD | COMMUNITY
End: 2021-10-13

## 2021-10-13 RX ORDER — OXYCODONE HYDROCHLORIDE 5 MG/1
20 TABLET ORAL EVERY 4 HOURS PRN
Status: DISCONTINUED | OUTPATIENT
Start: 2021-10-13 | End: 2021-10-14 | Stop reason: HOSPADM

## 2021-10-13 RX ORDER — LIDOCAINE HYDROCHLORIDE 10 MG/ML
INJECTION, SOLUTION EPIDURAL; INFILTRATION; INTRACAUDAL; PERINEURAL PRN
Status: DISCONTINUED | OUTPATIENT
Start: 2021-10-13 | End: 2021-10-13 | Stop reason: SDUPTHER

## 2021-10-13 RX ORDER — OXYCODONE AND ACETAMINOPHEN 10; 325 MG/1; MG/1
1 TABLET ORAL 3 TIMES DAILY
Status: ON HOLD | COMMUNITY
End: 2021-10-14 | Stop reason: SDUPTHER

## 2021-10-13 RX ORDER — KETAMINE HYDROCHLORIDE 50 MG/ML
1 INJECTION, SOLUTION, CONCENTRATE INTRAMUSCULAR; INTRAVENOUS ONCE
Status: COMPLETED | OUTPATIENT
Start: 2021-10-13 | End: 2021-10-13

## 2021-10-13 RX ORDER — SODIUM CHLORIDE 9 MG/ML
INJECTION, SOLUTION INTRAVENOUS CONTINUOUS
Status: DISCONTINUED | OUTPATIENT
Start: 2021-10-13 | End: 2021-10-14 | Stop reason: HOSPADM

## 2021-10-13 RX ORDER — HYDROMORPHONE HYDROCHLORIDE 1 MG/ML
0.5 INJECTION, SOLUTION INTRAMUSCULAR; INTRAVENOUS; SUBCUTANEOUS EVERY 5 MIN PRN
Status: DISCONTINUED | OUTPATIENT
Start: 2021-10-13 | End: 2021-10-13 | Stop reason: HOSPADM

## 2021-10-13 RX ORDER — MORPHINE SULFATE 2 MG/ML
2 INJECTION, SOLUTION INTRAMUSCULAR; INTRAVENOUS EVERY 4 HOURS PRN
Status: DISCONTINUED | OUTPATIENT
Start: 2021-10-13 | End: 2021-10-13

## 2021-10-13 RX ORDER — ESCITALOPRAM OXALATE 20 MG/1
30 TABLET ORAL DAILY
COMMUNITY

## 2021-10-13 RX ORDER — BUDESONIDE AND FORMOTEROL FUMARATE DIHYDRATE 160; 4.5 UG/1; UG/1
2 AEROSOL RESPIRATORY (INHALATION) 2 TIMES DAILY
COMMUNITY

## 2021-10-13 RX ORDER — BUMETANIDE 1 MG/1
1.5 TABLET ORAL DAILY
Status: DISCONTINUED | OUTPATIENT
Start: 2021-10-13 | End: 2021-10-14 | Stop reason: HOSPADM

## 2021-10-13 RX ORDER — SODIUM CHLORIDE 9 MG/ML
25 INJECTION, SOLUTION INTRAVENOUS PRN
Status: DISCONTINUED | OUTPATIENT
Start: 2021-10-13 | End: 2021-10-14 | Stop reason: HOSPADM

## 2021-10-13 RX ADMIN — FENTANYL CITRATE 100 MCG: 50 INJECTION, SOLUTION INTRAMUSCULAR; INTRAVENOUS at 13:25

## 2021-10-13 RX ADMIN — NITROGLYCERIN 20 MCG: 20 INJECTION INTRAVENOUS at 15:22

## 2021-10-13 RX ADMIN — PIPERACILLIN AND TAZOBACTAM 3375 MG: 3; .375 INJECTION, POWDER, LYOPHILIZED, FOR SOLUTION INTRAVENOUS at 10:04

## 2021-10-13 RX ADMIN — ROCURONIUM BROMIDE 50 MG: 10 INJECTION, SOLUTION INTRAVENOUS at 13:57

## 2021-10-13 RX ADMIN — POTASSIUM CHLORIDE 20 MEQ: 1500 TABLET, EXTENDED RELEASE ORAL at 08:00

## 2021-10-13 RX ADMIN — NITROGLYCERIN 10 MCG: 20 INJECTION INTRAVENOUS at 15:15

## 2021-10-13 RX ADMIN — ESCITALOPRAM OXALATE 30 MG: 10 TABLET ORAL at 08:00

## 2021-10-13 RX ADMIN — HYDROMORPHONE HYDROCHLORIDE 1 MG: 1 INJECTION, SOLUTION INTRAMUSCULAR; INTRAVENOUS; SUBCUTANEOUS at 21:29

## 2021-10-13 RX ADMIN — LAMOTRIGINE 200 MG: 200 TABLET ORAL at 08:01

## 2021-10-13 RX ADMIN — TOPIRAMATE 50 MG: 50 TABLET, FILM COATED ORAL at 08:00

## 2021-10-13 RX ADMIN — DOCUSATE SODIUM 100 MG: 100 CAPSULE ORAL at 07:59

## 2021-10-13 RX ADMIN — PIPERACILLIN AND TAZOBACTAM 3375 MG: 3; .375 INJECTION, POWDER, LYOPHILIZED, FOR SOLUTION INTRAVENOUS at 21:10

## 2021-10-13 RX ADMIN — ROCURONIUM BROMIDE 10 MG: 10 INJECTION, SOLUTION INTRAVENOUS at 14:30

## 2021-10-13 RX ADMIN — OXYCODONE 20 MG: 5 TABLET ORAL at 18:10

## 2021-10-13 RX ADMIN — CEFAZOLIN SODIUM 2000 MG: 1 INJECTION, POWDER, FOR SOLUTION INTRAMUSCULAR; INTRAVENOUS at 14:05

## 2021-10-13 RX ADMIN — ANTACID TABLETS 500 MG: 500 TABLET, CHEWABLE ORAL at 07:59

## 2021-10-13 RX ADMIN — NITROGLYCERIN 20 MCG: 20 INJECTION INTRAVENOUS at 15:27

## 2021-10-13 RX ADMIN — ROPIVACAINE HYDROCHLORIDE 20 ML: 5 INJECTION, SOLUTION EPIDURAL; INFILTRATION; PERINEURAL at 13:34

## 2021-10-13 RX ADMIN — Medication 400 MG: at 08:02

## 2021-10-13 RX ADMIN — ONDANSETRON HYDROCHLORIDE 4 MG: 2 INJECTION, SOLUTION INTRAMUSCULAR; INTRAVENOUS at 15:38

## 2021-10-13 RX ADMIN — DEXAMETHASONE SODIUM PHOSPHATE 10 MG: 4 INJECTION, SOLUTION INTRAMUSCULAR; INTRAVENOUS at 14:13

## 2021-10-13 RX ADMIN — Medication 2000 UNITS: at 08:02

## 2021-10-13 RX ADMIN — KETAMINE HYDROCHLORIDE 100 MG: 50 INJECTION INTRAMUSCULAR; INTRAVENOUS at 03:15

## 2021-10-13 RX ADMIN — NITROGLYCERIN 10 MCG: 20 INJECTION INTRAVENOUS at 15:18

## 2021-10-13 RX ADMIN — SUGAMMADEX 200 MG: 100 INJECTION, SOLUTION INTRAVENOUS at 15:56

## 2021-10-13 RX ADMIN — ROCURONIUM BROMIDE 5 MG: 10 INJECTION, SOLUTION INTRAVENOUS at 15:11

## 2021-10-13 RX ADMIN — POTASSIUM CHLORIDE 20 MEQ: 1500 TABLET, EXTENDED RELEASE ORAL at 21:11

## 2021-10-13 RX ADMIN — INSULIN LISPRO 3 UNITS: 100 INJECTION, SOLUTION INTRAVENOUS; SUBCUTANEOUS at 23:16

## 2021-10-13 RX ADMIN — METOPROLOL TARTRATE 25 MG: 50 TABLET, FILM COATED ORAL at 21:11

## 2021-10-13 RX ADMIN — FENTANYL CITRATE 50 MCG: 50 INJECTION, SOLUTION INTRAMUSCULAR; INTRAVENOUS at 13:56

## 2021-10-13 RX ADMIN — NITROGLYCERIN 10 MCG: 20 INJECTION INTRAVENOUS at 15:19

## 2021-10-13 RX ADMIN — MORPHINE SULFATE 1 MG: 2 INJECTION, SOLUTION INTRAMUSCULAR; INTRAVENOUS at 07:53

## 2021-10-13 RX ADMIN — PROPOFOL 50 MG: 10 INJECTION, EMULSION INTRAVENOUS at 15:59

## 2021-10-13 RX ADMIN — PROPOFOL 150 MG: 10 INJECTION, EMULSION INTRAVENOUS at 13:56

## 2021-10-13 RX ADMIN — MORPHINE SULFATE 2 MG: 2 INJECTION, SOLUTION INTRAMUSCULAR; INTRAVENOUS at 02:43

## 2021-10-13 RX ADMIN — TAMSULOSIN HYDROCHLORIDE 0.4 MG: 0.4 CAPSULE ORAL at 08:01

## 2021-10-13 RX ADMIN — SODIUM CHLORIDE, SODIUM LACTATE, POTASSIUM CHLORIDE, AND CALCIUM CHLORIDE: 600; 310; 30; 20 INJECTION, SOLUTION INTRAVENOUS at 13:51

## 2021-10-13 RX ADMIN — SODIUM CHLORIDE, PRESERVATIVE FREE 10 ML: 5 INJECTION INTRAVENOUS at 10:09

## 2021-10-13 RX ADMIN — METOPROLOL TARTRATE 25 MG: 50 TABLET, FILM COATED ORAL at 08:02

## 2021-10-13 RX ADMIN — ARFORMOTEROL TARTRATE 15 MCG: 15 SOLUTION RESPIRATORY (INHALATION) at 08:18

## 2021-10-13 RX ADMIN — ONDANSETRON 4 MG: 2 INJECTION INTRAMUSCULAR; INTRAVENOUS at 02:42

## 2021-10-13 RX ADMIN — ROPIVACAINE HYDROCHLORIDE 20 ML: 5 INJECTION, SOLUTION EPIDURAL; INFILTRATION; PERINEURAL at 13:31

## 2021-10-13 RX ADMIN — BUDESONIDE 500 MCG: 0.5 SUSPENSION RESPIRATORY (INHALATION) at 08:18

## 2021-10-13 RX ADMIN — ARFORMOTEROL TARTRATE 15 MCG: 15 SOLUTION RESPIRATORY (INHALATION) at 19:33

## 2021-10-13 RX ADMIN — SODIUM CHLORIDE: 9 INJECTION, SOLUTION INTRAVENOUS at 08:09

## 2021-10-13 RX ADMIN — TETANUS TOXOID, REDUCED DIPHTHERIA TOXOID AND ACELLULAR PERTUSSIS VACCINE, ADSORBED 0.5 ML: 5; 2.5; 8; 8; 2.5 SUSPENSION INTRAMUSCULAR at 03:45

## 2021-10-13 RX ADMIN — NITROGLYCERIN 10 MCG: 20 INJECTION INTRAVENOUS at 15:20

## 2021-10-13 RX ADMIN — BUDESONIDE 500 MCG: 0.5 SUSPENSION RESPIRATORY (INHALATION) at 19:33

## 2021-10-13 RX ADMIN — PANTOPRAZOLE SODIUM 40 MG: 40 TABLET, DELAYED RELEASE ORAL at 08:01

## 2021-10-13 RX ADMIN — SODIUM CHLORIDE, SODIUM LACTATE, POTASSIUM CHLORIDE, AND CALCIUM CHLORIDE: 600; 310; 30; 20 INJECTION, SOLUTION INTRAVENOUS at 15:35

## 2021-10-13 RX ADMIN — WATER 2000 MG: 1 INJECTION INTRAMUSCULAR; INTRAVENOUS; SUBCUTANEOUS at 02:59

## 2021-10-13 RX ADMIN — LIDOCAINE HYDROCHLORIDE 50 MG: 10 INJECTION, SOLUTION EPIDURAL; INFILTRATION; INTRACAUDAL; PERINEURAL at 13:56

## 2021-10-13 RX ADMIN — NITROGLYCERIN 20 MCG: 20 INJECTION INTRAVENOUS at 15:24

## 2021-10-13 RX ADMIN — SODIUM CHLORIDE: 9 INJECTION, SOLUTION INTRAVENOUS at 18:25

## 2021-10-13 RX ADMIN — LAMOTRIGINE 200 MG: 200 TABLET ORAL at 21:17

## 2021-10-13 RX ADMIN — NITROGLYCERIN 20 MCG: 20 INJECTION INTRAVENOUS at 15:25

## 2021-10-13 RX ADMIN — SODIUM CHLORIDE, PRESERVATIVE FREE 10 ML: 5 INJECTION INTRAVENOUS at 21:17

## 2021-10-13 RX ADMIN — DEXTROSE, SODIUM CHLORIDE, AND POTASSIUM CHLORIDE: 5; .45; .15 INJECTION INTRAVENOUS at 06:14

## 2021-10-13 RX ADMIN — ROCURONIUM BROMIDE 5 MG: 10 INJECTION, SOLUTION INTRAVENOUS at 15:23

## 2021-10-13 ASSESSMENT — PAIN DESCRIPTION - ORIENTATION: ORIENTATION: LEFT

## 2021-10-13 ASSESSMENT — ENCOUNTER SYMPTOMS
VOMITING: 0
WHEEZING: 0
SINUS PRESSURE: 0
NAUSEA: 0
ABDOMINAL PAIN: 0
CHEST TIGHTNESS: 0
TROUBLE SWALLOWING: 0
SHORTNESS OF BREATH: 1
SINUS PAIN: 0

## 2021-10-13 ASSESSMENT — PAIN SCALES - GENERAL
PAINLEVEL_OUTOF10: 9
PAINLEVEL_OUTOF10: 10
PAINLEVEL_OUTOF10: 10
PAINLEVEL_OUTOF10: 9
PAINLEVEL_OUTOF10: 10

## 2021-10-13 ASSESSMENT — LIFESTYLE VARIABLES: SMOKING_STATUS: 0

## 2021-10-13 ASSESSMENT — PAIN DESCRIPTION - LOCATION: LOCATION: LEG

## 2021-10-13 NOTE — PLAN OF CARE
EP Sign Out:    Was going to the bathroom and had slipped in fluid on the floor  fell and  distal Tib & Fib  Has an open fracture for which Ancef was given  Ortho asked for a dose of Zosyn which was tied for 8am  Ortho planning to take for washout with ORIF      Preliminary Plans: (ordered)    Left Open Dital Tibia & Fibula Fracture:  Admit to [facility]  Narcan NOT ordered for yet as per unknown allergic reaction (allergy documented)  Tylenol-Morphine1mg-Morphine2mg pain scale  Type and Screen and PT/INR and PTT to go along with the CBC with Diff and CMP  CBC and BMP with Mag reflex from the following morning  Ortho consulted already  NPO from [place]  [facility]  Insert Reddy for perioperative usage  Bed Rest  Add-on 25-HO Vitamin D level  Vitamin D 2,000 units PO QDay  Zosyn as planned, would consider to continue as Q6 to Q8h  F/U Blood and Urine Cxx     Chronic medical problems:  Continue home regimen as indicated    Supportive and Prophylactic Txx:  DVT PPx: Lovenox SQ to be held on the day of sugery   GI (PUD) PPx: not indicated  PT: defer to ortho

## 2021-10-13 NOTE — ED NOTES
Pt gave verbal and written consent for moderate sedation with reduction.       Reva Barker RN  10/13/21 4983

## 2021-10-13 NOTE — CARE COORDINATION
10/13/21: Pt is a bedhold at [address] [facility]  [address] [facility] [address]  Electronically signed by ANDREA Peralta on 10/13/2021 at 1:13 PM

## 2021-10-13 NOTE — ANESTHESIA POSTPROCEDURE EVALUATION
Department of Anesthesiology  Postprocedure Note    Patient: Ana Carranza  MRN: [id number]  YOB: 1945  Date of evaluation: 10/13/2021  Time:  4:27 PM     Procedure Summary     Date: 10/13/21 Room / Location: [address]    Anesthesia Start: 6828 Anesthesia Stop: 1627    Procedure: ANKLE OPEN REDUCTION INTERNAL FIXATION (Left Ankle) Diagnosis: (FRACTURED ANKLE)    Surgeons: Giselle Mary MD Responsible Provider: MIKAYLA Lucas CRNA    Anesthesia Type: general ASA Status: 2          Anesthesia Type: general    Whitley Phase I: Whitley Score: 8    Whitley Phase II:      Last vitals: Reviewed and per EMR flowsheets.        Anesthesia Post Evaluation    Patient location during evaluation: PACU  Level of consciousness: awake  Pain score: 0  Airway patency: patent  Nausea & Vomiting: no vomiting and no nausea  Complications: no  Cardiovascular status: hemodynamically stable  Respiratory status: acceptable and nasal cannula  Hydration status: stable  Multimodal analgesia pain management approach

## 2021-10-13 NOTE — BRIEF OP NOTE
Brief Postoperative Note      Patient: Penny Mtz  YOB: 1945  MRN: [id number]    Date of Procedure: 10/13/2021    Pre-Op Diagnosis: FRACTURED ANKLE    Post-Op Diagnosis: Same       Procedure(s):  ANKLE OPEN REDUCTION INTERNAL FIXATION    Surgeon(s):  Yan Yi MD    Assistant:  First Assistant: Mali Romano PA-C    Anesthesia: General    Estimated Blood Loss (mL): less than 927     Complications: None    Specimens:   * No specimens in log *    Implants:  * No implants in log *      Drains:   Urethral Catheter Straight-tip 16 fr (Active)   Catheter Indications Perioperative use for selected surgical procedures 10/13/21 0340   Urine Color Yellow 10/13/21 0340   Urine Appearance Clear 10/13/21 0340   Output (mL) 200 mL 10/13/21 1219     TT:64 minutes    Electronically signed by Yan Yi MD on 10/13/2021 at 3:39 PM

## 2021-10-13 NOTE — ANESTHESIA PROCEDURE NOTES
Peripheral Block    Patient location during procedure: holding area  Start time: 10/13/2021 1:33 PM  End time: 10/13/2021 1:35 PM  Staffing  Performed: anesthesiologist   Anesthesiologist: Kodi Goodman MD  Preanesthetic Checklist  Completed: patient identified, IV checked, site marked, risks and benefits discussed, surgical consent, monitors and equipment checked, pre-op evaluation, timeout performed, anesthesia consent given, oxygen available and patient being monitored  Peripheral Block  Patient position: supine  Prep: ChloraPrep  Patient monitoring: continuous pulse ox and frequent blood pressure checks  Block type: Sciatic  Laterality: left  Injection technique: single-shot  Guidance: ultrasound guided  Local infiltration: lidocaine  Popliteal  Provider prep: sterile gloves and mask  Local infiltration: lidocaine  Needle  Needle type: Quincke   Needle gauge: 20 G  Needle length: 10 cm  Needle localization: ultrasound guidance  Assessment  Injection assessment: negative aspiration for heme, no paresthesia on injection and local visualized surrounding nerve on ultrasound  Paresthesia pain: none  Slow fractionated injection: yes  Hemodynamics: stable  Medications Administered  Ropivacaine (NAROPIN) injection 0.5%, 20 mL  Reason for block: post-op pain management

## 2021-10-13 NOTE — ANESTHESIA PROCEDURE NOTES
Peripheral Block    Patient location during procedure: holding area  Start time: 10/13/2021 1:30 PM  End time: 10/13/2021 1:32 PM  Staffing  Performed: anesthesiologist   Anesthesiologist: Radha Villafana MD  Preanesthetic Checklist  Completed: patient identified, IV checked, site marked, risks and benefits discussed, surgical consent, monitors and equipment checked, pre-op evaluation, timeout performed, anesthesia consent given, oxygen available and patient being monitored  Peripheral Block  Prep: ChloraPrep  Patient monitoring: continuous pulse ox and frequent blood pressure checks  Block type: Femoral  Laterality: left  Injection technique: single-shot  Guidance: ultrasound guided  Local infiltration: lidocaine  Adductor canal  Provider prep: sterile gloves and mask  Local infiltration: lidocaine  Needle  Needle type: Quincke   Needle gauge: 20 G  Needle length: 10 cm  Needle localization: ultrasound guidance  Assessment  Injection assessment: negative aspiration for heme, no paresthesia on injection and local visualized surrounding nerve on ultrasound  Slow fractionated injection: yes  Hemodynamics: stable  Medications Administered  Ropivacaine (NAROPIN) injection 0.5%, 20 mL  Reason for block: post-op pain management

## 2021-10-13 NOTE — ED PROVIDER NOTES
[facility] 3 ADELSO/VAS/MED  eMERGENCY dEPARTMENT eNCOUnter      Pt Name: Karine Mehta  MRN: [id number]  [place] 1945  Date of evaluation: 10/13/2021  Provider: Madhavi Vidal MD    CHIEF COMPLAINT       Chief Complaint   Patient presents with    Fall     fall tonight at nursing home    Leg Pain     left leg pain. deformity noted to left foot and ankle          HISTORY OF PRESENT ILLNESS   (Location/Symptom, Timing/Onset,Context/Setting, Quality, Duration, Modifying Factors, Severity)  Note limiting factors. Karine Mehta is a 78 y.o. female who presents to the emergency department after fall. Patient states that \"they recently increase my water pill. I urinate a lot more. \"  Patient states that she was in the bathroom urinating \"I thought I was done. I started to get up and urine went everywhere. \"  Patient states that the wet floor caused her to slip and fall. Before she could sit back down she fell, injuring her left leg. EMS was called and stated that her left leg had a noted deformity with an open fracture. Patient was unable to ambulate. Patient gave her fentanyl in route for pain. Patient denies striking her head or passing out. Patient denies other injuries. Patient is normally on 2 L of oxygen at baseline. Patient's pain severity has gone from a 10 out of 10 to 4 out of 10 after fentanyl in route. HPI    NursingNotes were reviewed. REVIEW OF SYSTEMS    (2-9 systems for level 4, 10 or more for level 5)     Review of Systems   Constitutional: Negative for chills and fever. HENT: Negative for congestion and rhinorrhea. Respiratory: Negative for cough and shortness of breath. Cardiovascular: Positive for leg swelling. Negative for chest pain and palpitations. Gastrointestinal: Negative for abdominal pain, nausea and vomiting. Genitourinary: Negative for difficulty urinating and dysuria. Musculoskeletal: Negative for back pain and neck pain.         Pain, injury, and deformity of left lower leg. There is a small open lesion representing open fracture at the distal tibia. Skin: Positive for wound. Negative for pallor. Neurological: Negative for syncope and light-headedness. Psychiatric/Behavioral: Negative for agitation and confusion. All other systems reviewed and are negative.            PAST MEDICALHISTORY     Past Medical History:   Diagnosis Date    Anorexia nervosa     Anxiety     Aortic stenosis     Atherosclerotic heart disease     Back pain     Bipolar 1 disorder (HCC)     Chronic kidney disease     Cognitive communication deficit     [facility] [address]     Depression     Diabetes mellitus (HCC)     Difficulty walking     Drug induced constipation     Dysphagia     Edema     Heart disease     Heart failure ([facility] [address]     Hypertension     Obstructive sleep apnea     Repeated falls     Weakness          SURGICAL HISTORY       Past Surgical History:   Procedure Laterality Date    ANKLE FRACTURE SURGERY Left 10/13/2021    ANKLE OPEN REDUCTION INTERNAL FIXATION performed by Hoa Pinzon MD at [address] [facility]     Current Discharge Medication List      CONTINUE these medications which have NOT CHANGED    Details   OXYGEN Inhale 2 L into the lungs daily      acetaminophen (TYLENOL) 325 MG tablet Take 650 mg by mouth every 4 hours as needed for Pain      Biotin 5000 MCG TABS Take by mouth daily      bumetanide (BUMEX) 1 MG tablet Take 1.5 mg by mouth daily      docusate sodium (COLACE) 100 MG capsule Take 100 mg by mouth daily      escitalopram (LEXAPRO) 20 MG tablet Take 30 mg by mouth daily      lamoTRIgine (LAMICTAL) 200 MG tablet Take 200 mg by mouth 2 times daily      magnesium oxide (MAG-OX) 400 MG tablet Take 400 mg by mouth daily      Melatonin 5 MG CAPS Take by mouth nightly      metoprolol tartrate (LOPRESSOR) 25 MG tablet Take 25 mg by mouth 2 times daily      pantoprazole (PROTONIX) 40 MG tablet Take 40 mg by mouth 2 times daily      potassium chloride (KLOR-CON M) 20 MEQ TBCR extended release tablet Take 20 mEq by mouth 2 times daily      Albuterol Sulfate, sensor, (PROAIR DIGIHALER) 108 (90 Base) MCG/ACT AEPB Inhale into the lungs      budesonide-formoterol (SYMBICORT) 160-4.5 MCG/ACT AERO Inhale 2 puffs into the lungs 2 times daily      tamsulosin (FLOMAX) 0.4 MG capsule Take 0.4 mg by mouth daily      topiramate (TOPAMAX) 50 MG tablet Take 50 mg by mouth 2 times daily      calcium carbonate (TUMS) 500 MG chewable tablet Take 1 tablet by mouth every 6 hours as needed       diclofenac sodium (VOLTAREN) 1 % GEL Apply topically 4 times daily as needed for Pain Apply to affected area for pain as needed      !! Cholecalciferol (VITAMIN D3) 125 MCG (5000 UT) TABS Take 1 tablet by mouth daily      nitroGLYCERIN (NITROSTAT) 0.4 MG SL tablet Place 0.4 mg under the tongue every 5 minutes as needed for Chest pain up to max of 3 total doses. If no relief after 1 dose, call 911. polyethylene glycol (GLYCOLAX) 17 g packet Take 17 g by mouth daily as needed for Constipation       !! - Potential duplicate medications found. Please discuss with provider. ALLERGIES     Aripiprazole, Naloxone, and Pentazocine    FAMILY HISTORY     History reviewed. No pertinent family history.        SOCIAL HISTORY       Social History     Socioeconomic History    Marital status:      Spouse name: None    Number of children: None    Years of education: None    Highest education level: None   Occupational History    None   Tobacco Use    Smoking status: Never Smoker    Smokeless tobacco: Never Used   Vaping Use    Vaping Use: Never used   Substance and Sexual Activity    Alcohol use: Never    Drug use: Never    Sexual activity: None   Other Topics Concern    None   Social History Narrative    None     Social Determinants of Health     Financial Resource Strain:     Difficulty of Paying Living Expenses:    Food normal.      Breath sounds: Normal breath sounds. Abdominal:      General: Bowel sounds are normal.      Palpations: Abdomen is soft. Tenderness: There is no abdominal tenderness. There is no guarding. Musculoskeletal:         General: Swelling, tenderness, deformity and signs of injury present. Cervical back: Neck supple. No rigidity. Right lower le+ Edema present. Left lower leg: Swelling, deformity, laceration, tenderness and bony tenderness present. 2+ Edema present. Legs:       Comments: 1+ dorsalis pedis bilaterally. Brisk capillary refill bilaterally. Sensation to bilateral lower extremities and feet are equal bilaterally and intact. Patient can wiggle toes on both feet. Patient is not asked to manipulate the lower extremity at the ankle due to significant deformity on the left. Skin:     General: Skin is warm and dry. Capillary Refill: Capillary refill takes less than 2 seconds. Coloration: Skin is not jaundiced. Findings: Bruising present. Neurological:      General: No focal deficit present. Mental Status: She is alert and oriented to person, place, and time. Mental status is at baseline. Sensory: Sensory deficit present. Motor: Weakness (General) present. Coordination: Coordination normal.   Psychiatric:         Mood and Affect: Mood normal.         Behavior: Behavior normal.         DIAGNOSTIC RESULTS     EKG: All EKG's areinterpreted by the Emergency Department Physician who either signs or Co-signs this chart in the absence of a cardiologist.    EKG dated 10/13/2021 at 0 2:42 AM: Normal sinus rhythm, rate 67. Prolonged KS interval at 248. Artifact present. Q waves present in 3 and aVF.     RADIOLOGY:  Non-plain film images such as CT, Ultrasound and MRI are read by the radiologist. Plain radiographic images are visualized and preliminarily interpreted bythe emergency physician with the below findings:      XR ANKLE LEFT (MIN 3 VIEWS)   Final Result      XR ANKLE LEFT (2 VIEWS)   Final Result   Successful reduction of the left ankle fracture   dislocation, with mild residual lateral subluxation of the talus   relative to the tibia. Signed by Dr Carlos Post. Vanhoose      XR ANKLE LEFT (MIN 3 VIEWS)   Final Result   Acute fracture dislocation of the left ankle with   displacement of the tibia relative to the talus measuring up to 2.4   cm. There are bimalleolar fractures, possible posterior malleolus   fracture also. Signed by Dr Carlos Post. Vanhoose      XR FOOT LEFT (MIN 3 VIEWS)   Final Result   Left ankle fracture dislocation described in detail on   separate reports. No acute fractures involving the bones of the foot. Extensive soft tissue swelling seen at the ankle and dorsal aspect of   the foot. Signed by Dr Carlos Post. Vanhoose      XR CHEST PORTABLE   Final Result   Left lung base is poorly evaluated due to suboptimal   exposure. An infiltrate/consolidation in the left lower lung cannot be   excluded. The remaining visualized lungs are unremarkable. A moderate cardiomegaly.    Signed by Dr José Miguel Kim    (Results Pending)           LABS:  Labs Reviewed   CULTURE, URINE - Abnormal; Notable for the following components:       Result Value    Urine Culture, Routine >100,000 CFU/ml (*)     Organism Escherichia coli (*)     All other components within normal limits    Narrative:     ORDER#: [id number]                          ORDERED BY: CASH YU  SOURCE: Urine Clean Catch                  COLLECTED:  10/13/21 03:38  ANTIBIOTICS AT [facility].:                      RECEIVED :  10/13/21 03:40   CBC WITH AUTO DIFFERENTIAL - Abnormal; Notable for the following components:    RBC 3.97 (*)     Hemoglobin 11.4 (*)     MCHC 29.9 (*)     All other components within normal limits   COMPREHENSIVE METABOLIC PANEL W/ REFLEX TO MG FOR LOW K - Abnormal; Notable for the following components:    Glucose 120 (*)     BUN 27 (*)     CREATININE 1.6 (*)     GFR Non- 31 (*)     GFR  38 (*)     Alkaline Phosphatase 113 (*)     All other components within normal limits   URINE RT REFLEX TO CULTURE - Abnormal; Notable for the following components:    Clarity, UA CLOUDY (*)     Ketones, Urine TRACE (*)     Protein, UA TRACE (*)     Nitrite, Urine POSITIVE (*)     Leukocyte Esterase, Urine MODERATE (*)     All other components within normal limits   MICROSCOPIC URINALYSIS - Abnormal; Notable for the following components:    RBC, UA 3-5 (*)     Bacteria, UA 3+ (*)     All other components within normal limits   BASIC METABOLIC PANEL W/ REFLEX TO MG FOR LOW K - Abnormal; Notable for the following components:    Glucose 136 (*)     BUN 27 (*)     CREATININE 1.7 (*)     GFR Non- 29 (*)     GFR  36 (*)     All other components within normal limits   HEMOGLOBIN AND HEMATOCRIT, BLOOD - Abnormal; Notable for the following components:    Hemoglobin 9.5 (*)     Hematocrit 32.0 (*)     All other components within normal limits   BASIC METABOLIC PANEL - Abnormal; Notable for the following components:    Potassium 5.3 (*)     Glucose 127 (*)     BUN 24 (*)     CREATININE 1.5 (*)     GFR Non- 34 (*)     GFR  41 (*)     All other components within normal limits   POCT GLUCOSE - Abnormal; Notable for the following components:    POC Glucose 110 (*)     All other components within normal limits   POCT GLUCOSE - Abnormal; Notable for the following components:    POC Glucose 162 (*)     All other components within normal limits   POCT GLUCOSE - Abnormal; Notable for the following components:    POC Glucose 273 (*)     All other components within normal limits   POCT GLUCOSE - Abnormal; Notable for the following components:    POC Glucose 135 (*)     All other components within normal limits   POCT GLUCOSE - Abnormal; Notable for the following components:    POC Glucose 148 (*)     All other components within normal limits   POCT GLUCOSE - Abnormal; Notable for the following components:    POC Glucose 117 (*)     All other components within normal limits   COVID-19, RAPID   CULTURE, BLOOD 2    Narrative:     ORDER#: [id number]                          ORDERED BY: Daljit Giordano  SOURCE: Blood Right Hand                   COLLECTED:  10/13/21 06:00  ANTIBIOTICS AT [facility].:                      RECEIVED :  10/13/21 06:20   CULTURE, BLOOD 1    Narrative:     ORDER#: [id number]                          ORDERED BY: Daljit Giordano  SOURCE: Blood Right Wrist                  COLLECTED:  10/13/21 06:18  ANTIBIOTICS AT [facility].:                      RECEIVED :  10/13/21 06:20   TROPONIN   PROTIME-INR   APTT   VITAMIN D 25 HYDROXY   HEMOGLOBIN A1C    Narrative:     add   POCT GLUCOSE   POCT GLUCOSE   POCT GLUCOSE   POCT GLUCOSE   POCT GLUCOSE   POCT GLUCOSE   POCT GLUCOSE   POCT GLUCOSE   TYPE AND SCREEN CAPTURE THREE SCREEN CELL       All other labs were within normal range or not returned as of this dictation. EMERGENCY DEPARTMENT COURSE and DIFFERENTIAL DIAGNOSIS/MDM:   Vitals:    Vitals:    10/14/21 0012 10/14/21 0652 10/14/21 1159 10/14/21 1345   BP: (!) 120/47 (!) 139/56 (!) 119/42    Pulse: 62 60 65    Resp: 16 18 16    Temp: 96.8 °F (36 °C) 97.2 °F (36.2 °C) 97 °F (36.1 °C)    TempSrc: Temporal Temporal Temporal    SpO2: 93% 93% 94%    Weight: 229 lb 1 oz (103.9 kg)      Height:    5' 2\" (1.575 m)       MDM  72-year-old female presents after slipping and falling in the bathroom and injuring her left lower leg. Lab, EKG, and radiology results reviewed. Tib-fib reduced under ketamine and splinted. Fracture site unstable. Preoperative work-up in progress. Patient will be kept n.p.o. Ancef 2 g given in the emergency department. Tetanus updated. Discussed with Dr. Peter Encarnacion, orthopedics, who request IV Zosyn and n.p.o. status at this time.  Discussed with  Gina Troy, hospitalist, who will admit patient for evaluation and treatment. CONSULTS:  [address]  IP CONSULT TO SOCIAL WORK  IP CONSULT TO HOME CARE NEEDS    PROCEDURES:  Unless otherwise noted below, none     Ortho Injury    Date/Time: 10/13/2021 4:14 AM  Performed by: Anahi Ferguson MD  Authorized by: Anahi Ferguson MD   Consent: Verbal consent obtained. Written consent obtained.   Risks and benefits: risks, benefits and alternatives were discussed  Patient identity confirmed: verbally with patient and arm band  Injury location: lower leg  Location details: left lower leg  Injury type: fracture  Fracture type: tibial and fibular shafts  Pre-procedure distal perfusion: diminished  Pre-procedure distal perfusion comment: Bilaterally symmetric  Pre-procedure neurological function: diminished  Pre-procedure neurological function comment: Laterally symmetric  Pre-procedure range of motion: reduced    Anesthesia:  Local anesthesia used: no    Sedation:  Patient sedated: yes  Sedatives: ketamine  Analgesia: ketamine    Manipulation performed: yes  Skeletal traction used: yes  Reduction successful: yes  X-ray confirmed reduction: yes  Immobilization: splint  Splint type: short leg  Supplies used: elastic bandage  Post-procedure distal perfusion: diminished  Post-procedure distal perfusion comment: No change  Post-procedure neurological function: diminished  Post-procedure neurological function comment: No change  Post-procedure range of motion: unchanged  Patient tolerance: patient tolerated the procedure well with no immediate complications    Procedural sedation    Date/Time: 10/13/2021 4:17 AM  Performed by: Anahi Ferguson MD  Authorized by: Anahi Ferguson MD     Consent:     Consent obtained:  Written    Consent given by:  Patient    Risks discussed:  Nausea, vomiting, inadequate sedation and allergic reaction    Alternatives discussed:  Analgesia without sedation  Indications:     Procedure necessitating sedation performed by:  Physician performing sedation    Intended level of sedation:  Moderate (conscious sedation)  Pre-sedation assessment:     NPO status caution: unable to specify NPO status      ASA classification: class 3 - patient with severe systemic disease      Neck mobility: reduced      Mouth openin finger widths    Thyromental distance:  2 finger widths    Mallampati score:  III - soft palate, base of uvula visible    Pre-sedation assessments completed and reviewed: airway patency, anesthesia/sedation history, cardiovascular function, hydration status, mental status, nausea/vomiting, pain level, respiratory function and temperature      History of difficult intubation: no    Immediate pre-procedure details:     Reassessment: Patient reassessed immediately prior to procedure      Reviewed: vital signs and relevant labs/tests      Verified: bag valve mask available, emergency equipment available, intubation equipment available, IV patency confirmed, oxygen available, reversal medications available and suction available    Procedure details (see MAR for exact dosages):     Preoxygenation:  Room air    Sedation:  Ketamine    Analgesia: Ketamine. Intra-procedure monitoring:  Blood pressure monitoring, cardiac monitor, continuous pulse oximetry and frequent vital sign checks    Intra-procedure events: none    Post-procedure details:     Attendance: Constant attendance by certified staff until patient recovered      Recovery: Patient returned to pre-procedure baseline      Post-sedation assessments completed and reviewed: airway patency, cardiovascular function, hydration status, mental status, nausea/vomiting, pain level, respiratory function and temperature      Specimens recovered:  None    Patient is stable for discharge or admission: yes      Patient tolerance: Tolerated well, no immediate complications        FINAL IMPRESSION      1.  Type I or II open fracture of left tibia and fibula, initial encounter    2. Fall, initial encounter    3. Acute urinary tract infection    4.  Bipolar affective disorder, remission status unspecified ([facility] [address]          DISPOSITION/PLAN   DISPOSITION Admitted 10/13/2021 05:54:28 AM           (Please note that portions of this note were completed with a voice recognition program.  Efforts were made to edit thedictations but occasionally words are mis-transcribed.)    Wellington Richardson MD (electronically signed)  Attending Emergency Physician          Wellington Richardson MD  10/14/21 [phone]

## 2021-10-13 NOTE — CONSULTS
Orthopaedic Inpatient Consultation    NAME:  Dallin Martin   : 1945  MRN: [id number]    10/13/2021  2:12 AM    Requesting Physician: Dr. Ramirez Cruz:  left ankle pain s/p fall with fracture dislocation      HISTORY OF PRESENT ILLNESS:   The patient is a 78 y.o. female who states that she was recently started on Bumex and she felt the urge to go to the bathroom. She was in a hurry and knocked over a cup of water that spilled onto the tile floor. When she had finished in the bathroom as she was walking out she slipped on the wet floor and twist her left ankle and fell. Injuring her left ankle. She denies loss of consciousness or other injuries. She has no other orthopaedic complaints. She was transferred to [address] [place] [address] ER from [place], x-rays completed in the ER confirmed fracture dislocation. They were able to reduce and splint the injury. We are seeing her for definitive  Ortho treatment. Pain is located in the left ankle and rated a 5/5, constant, dull, worse with movement, better with rest and medication. There are no associated symptoms. Past Medical History:        Diagnosis Date    Anorexia nervosa     Anxiety     Aortic stenosis     Atherosclerotic heart disease     Back pain     Bipolar 1 disorder (HCC)     Chronic kidney disease     Cognitive communication deficit     COPD (chronic obstructive pulmonary disease) (HCC)     Depression     Diabetes mellitus (HCC)     Difficulty walking     Drug induced constipation     Dysphagia     Edema     Heart disease     Heart failure (HCC)     Hypertension     Obstructive sleep apnea     Repeated falls     Weakness        Past Surgical History:    History reviewed. No pertinent surgical history. Current Medications:   Prior to Admission medications    Medication Sig Start Date End Date Taking?  Authorizing Provider   OXYGEN Inhale 2 L into the lungs daily   Yes Historical Provider, MD   zolpidem (AMBIEN) 10 MG tablet Take by mouth nightly. Yes Historical Provider, MD   Biotin 5000 MCG TABS Take by mouth daily   Yes Historical Provider, MD   bumetanide (BUMEX) 1 MG tablet Take 1.5 mg by mouth daily   Yes Historical Provider, MD   clonazePAM (KLONOPIN) 0.5 MG tablet Take 0.5 mg by mouth nightly. Yes Historical Provider, MD   docusate sodium (COLACE) 100 MG capsule Take 100 mg by mouth daily   Yes Historical Provider, MD   escitalopram (LEXAPRO) 20 MG tablet Take 30 mg by mouth daily   Yes Historical Provider, MD   lamoTRIgine (LAMICTAL) 200 MG tablet Take 200 mg by mouth 2 times daily   Yes Historical Provider, MD   magnesium oxide (MAG-OX) 400 MG tablet Take 400 mg by mouth daily   Yes Historical Provider, MD   Melatonin 5 MG CAPS Take by mouth nightly   Yes Historical Provider, MD   metoprolol tartrate (LOPRESSOR) 25 MG tablet Take 25 mg by mouth 2 times daily   Yes Historical Provider, MD   oxyCODONE-acetaminophen (PERCOCET)  MG per tablet Take 1 tablet by mouth 3 times daily.    Yes Historical Provider, MD   pantoprazole (PROTONIX) 40 MG tablet Take 40 mg by mouth 2 times daily   Yes Historical Provider, MD   potassium chloride (KLOR-CON M) 20 MEQ TBCR extended release tablet Take 20 mEq by mouth 2 times daily   Yes Historical Provider, MD   Albuterol Sulfate, sensor, (PROAIR DIGIHALER) 108 (90 Base) MCG/ACT AEPB Inhale into the lungs   Yes Historical Provider, MD   budesonide-formoterol (SYMBICORT) 160-4.5 MCG/ACT AERO Inhale 2 puffs into the lungs 2 times daily   Yes Historical Provider, MD   tamsulosin (FLOMAX) 0.4 MG capsule Take 0.4 mg by mouth daily   Yes Historical Provider, MD   topiramate (TOPAMAX) 50 MG tablet Take 50 mg by mouth 2 times daily   Yes Historical Provider, MD   calcium carbonate (TUMS) 500 MG chewable tablet Take 1 tablet by mouth daily   Yes Historical Provider, MD   vitamin D (ERGOCALCIFEROL) 1.25 MG (57050 UT) CAPS capsule Take 50,000 Units by mouth once a week   Yes Historical Provider, MD   acetaminophen (TYLENOL) 325 MG tablet Take 650 mg by mouth every 4 hours as needed for Pain    Historical Provider, MD       Allergies:  Aripiprazole, Naloxone, and Pentazocine    Social History:   Social History     Socioeconomic History    Marital status:      Spouse name: Not on file    Number of children: Not on file    Years of education: Not on file    Highest education level: Not on file   Occupational History    Not on file   Tobacco Use    Smoking status: Never Smoker    Smokeless tobacco: Never Used   Vaping Use    Vaping Use: Never used   Substance and Sexual Activity    Alcohol use: Never    Drug use: Never    Sexual activity: Not on file   Other Topics Concern    Not on file   Social History Narrative    Not on file     Social Determinants of Health     Financial Resource Strain:     Difficulty of Paying Living Expenses:    Food Insecurity:     Worried About [employer] [employer] in the Last Year:     [address] [employer] in the Last Year:    Transportation Needs:     Lack of Transportation (Medical):  Lack of Transportation (Non-Medical):    Physical Activity:     Days of Exercise per Week:     Minutes of Exercise per Session:    Stress:     Feeling of Stress :    Social Connections:     Frequency of Communication with Friends and Family:     Frequency of Social Gatherings with Friends and Family:     Attends Church Services:     Active Member of Clubs or Organizations:     Attends Club or Organization Meetings:     Marital Status:    Intimate Partner Violence:     Fear of Current or Ex-Partner:     Emotionally Abused:     Physically Abused:     Sexually Abused:        Family History:   History reviewed. No pertinent family history. REVIEW OF SYSTEMS:  14 point review of systems has been reviewed from the patient's emergency room visit, reviewed with the patient on today's date with no new changes.     PHYSICAL EXAM:      Physical 10/13/2021    CO2 27 10/13/2021    BUN 27 10/13/2021    CREATININE 1.7 10/13/2021    GFRAA 36 10/13/2021    LABGLOM 29 10/13/2021    GLUCOSE 136 10/13/2021    PROT 7.3 10/13/2021    CALCIUM 9.1 10/13/2021    BILITOT <0.2 10/13/2021    ALKPHOS 113 10/13/2021    AST 12 10/13/2021    ALT 7 10/13/2021     BMP:    Lab Results   Component Value Date     10/13/2021    K 4.5 10/13/2021     10/13/2021    CO2 27 10/13/2021    BUN 27 10/13/2021    CREATININE 1.7 10/13/2021    CALCIUM 9.1 10/13/2021    GFRAA 36 10/13/2021    LABGLOM 29 10/13/2021    GLUCOSE 136 10/13/2021         Radiology: I have reviewed the radiology images listed below and agree with the findings of the interpreting radiologist(s). XR ANKLE LEFT (2 VIEWS)    Result Date: 10/13/2021  EXAMINATION: XR ANKLE LEFT (2 VIEWS) 10/13/2021 8:41 AM HISTORY: Reduction of left ankle fracture dislocation. Report: AP and lateral views of the left ankle are compared with the x-rays performed approximately one hour earlier. There is successful reduction of the ankle dislocation, with mild residual lateral subluxation of the talus relative to the tibia. There are fractures of the medial malleolus, distal shaft of the fibula, with mild residual displacement. No definite fracture of the posterior malleolus is seen. Successful reduction of the left ankle fracture dislocation, with mild residual lateral subluxation of the talus relative to the tibia. Signed by Dr Julissa Veliz. mAbreen    XR ANKLE LEFT (MIN 3 VIEWS)    Result Date: 10/13/2021  EXAMINATION: XR ANKLE LEFT (MIN 3 VIEWS) 10/13/2021 8:35 AM HISTORY: Trauma, fracture. Report: 3 views of the left ankle were obtained. COMPARISON: There are no correlative imaging studies for comparison. There is acute fracture dislocation of the ankle, including a transverse fracture of the medial malleolus, oblique fracture of the distal shaft of the fibula, with medial angulation.  On the lateral view, there is also anterior displacement of the distal tibia and a posterior malleolus fracture is likely. There is lateral subluxation at the tibiotalar joint, of approximately 2.4 cm. The fractures are closed. Extensive overlying soft tissue swelling is present. Acute fracture dislocation of the left ankle with displacement of the tibia relative to the talus measuring up to 2.4 cm. There are bimalleolar fractures, possible posterior malleolus fracture also. Signed by Dr Darling Lara. Ambreen    XR FOOT LEFT (MIN 3 VIEWS)    Result Date: 10/13/2021  EXAMINATION: XR FOOT LEFT (MIN 3 VIEWS) 10/13/2021 8:43 AM HISTORY: Trauma, ankle fractures. Report: 3 views of the left foot were obtained. COMPARISON: There are no correlative imaging studies for comparison. Fractures of the distal shaft of the fibula and medial malleolus are described in detail in a separate report. There is extensive soft tissue swelling over the foot and ankle. The bony structures of the foot are intact. There is anterior subluxation of the tibia relative to the talus. Inferior calcaneal enthesophyte is present. Left ankle fracture dislocation described in detail on separate reports. No acute fractures involving the bones of the foot. Extensive soft tissue swelling seen at the ankle and dorsal aspect of the foot. Signed by Dr Darling Lara. Ambreen    XR CHEST PORTABLE    Result Date: 10/13/2021  Examination. XR CHEST PORTABLE 10/13/2021 2:38 AM History: Preop evaluation. A single frontal portable upright view of the chest is obtained. There is no previous study for comparison. The lungs are moderately expanded. Left lung base is not optimally visualized or evaluated. Any process, infiltrate or consolidation is not excluded. The remaining visualized lungs are unremarkable without evidence of an infiltrate, pleural effusion, pulmonary congestion or pneumothorax. There is moderate cardiomegaly. Atheromatous changes thoracic aorta are noted.  No acute bony

## 2021-10-13 NOTE — H&P
[facility] - History & Physical      PCP: Radha López    Date of Admission: 10/13/2021    Date of Service: 10/13/2021    Chief Complaint:  Fall; left ankle pain    History Of Present Illness: The patient is a 78 y.o. female with a history of CKD, COPD, DM, Bipolar, and heart failure who presented to [address] [place] [address] ED complaining of a fall with left ankle pain. She states she was recently started on Bumex and he been urinating frequently. She states she spilled a glass of water and slipped on the water after leaving the bathroom. He had immediate pain to the left ankle and noted open skin. The patient has chronic pain and shortness of breath that she states are no worse than normal. With frequency she also endorse urgency and times of incontinence. She denies chest pain or palpitations. She denies LOC or striking her head during th fall. Past Medical History:        Diagnosis Date    Anorexia nervosa     Anxiety     Aortic stenosis     Atherosclerotic heart disease     Back pain     Bipolar 1 disorder (HCC)     Chronic kidney disease     Cognitive communication deficit     COPD (chronic obstructive pulmonary disease) (HCC)     Depression     Diabetes mellitus (HCC)     Difficulty walking     Drug induced constipation     Dysphagia     Edema     Heart disease     Heart failure (HCC)     Hypertension     Obstructive sleep apnea     Repeated falls     Weakness        Past Surgical History:    History reviewed. No pertinent surgical history. Home Medications:  Prior to Admission medications    Medication Sig Start Date End Date Taking? Authorizing Provider   OXYGEN Inhale 2 L into the lungs daily   Yes Historical Provider, MD   zolpidem (AMBIEN) 10 MG tablet Take by mouth nightly.    Yes Historical Provider, MD   Biotin 5000 MCG TABS Take by mouth daily   Yes Historical Provider, MD   bumetanide (BUMEX) 1 MG tablet Take 1.5 mg by mouth daily   Yes Historical Provider, MD currently lives home alone  Tobacco:   reports that she has never smoked. She has never used smokeless tobacco.  Alcohol:   reports no history of alcohol use. Illicit Drugs: denies    Family History:  History reviewed. No pertinent family history. Review of Systems:   Review of Systems   Constitutional: Negative for chills, fatigue and fever. HENT: Negative for congestion, sinus pressure, sinus pain and trouble swallowing. Respiratory: Positive for shortness of breath (chronic, not worsening). Negative for chest tightness and wheezing. Cardiovascular: Negative for chest pain and palpitations. Gastrointestinal: Negative for abdominal pain, nausea and vomiting. Genitourinary: Positive for frequency and urgency. Negative for difficulty urinating and dysuria. Musculoskeletal: Positive for arthralgias and myalgias (chronic). Skin: Positive for wound (left ankle/leg). Neurological: Negative for dizziness, syncope and light-headedness. Psychiatric/Behavioral: Negative for agitation and confusion. 14 point review of systems is negative except as specifically addressed above. Physical Examination:  BP (!) 131/53   Pulse 77   Temp 97.5 °F (36.4 °C) (Temporal)   Resp 18   Wt 217 lb (98.4 kg)   SpO2 99%   Physical Exam  Constitutional:       Appearance: She is obese. HENT:      Head: Normocephalic and atraumatic. Mouth/Throat:      Mouth: Mucous membranes are dry. Eyes:      Extraocular Movements: Extraocular movements intact. Conjunctiva/sclera: Conjunctivae normal.      Pupils: Pupils are equal, round, and reactive to light. Cardiovascular:      Rate and Rhythm: Normal rate and regular rhythm. Pulses: Normal pulses. Heart sounds: Murmur heard. Pulmonary:      Effort: Pulmonary effort is normal.      Breath sounds: Normal breath sounds. Abdominal:      General: Bowel sounds are normal. There is no distension. Palpations: Abdomen is soft. Tenderness: There is no abdominal tenderness. There is no guarding. Musculoskeletal:         General: Tenderness, deformity and signs of injury present. Cervical back: Normal range of motion and neck supple. Skin:     General: Skin is warm and dry. Capillary Refill: Capillary refill takes less than 2 seconds. Comments: Splint noted to LLE, bloody drainage noted   Neurological:      General: No focal deficit present. Mental Status: She is alert and oriented to person, place, and time. Psychiatric:         Mood and Affect: Mood normal.         Behavior: Behavior normal.          Diagnostic Data:  CBC:  Recent Labs     10/13/21  0237   WBC 5.9   HGB 11.4*   HCT 38.1        BMP:  Recent Labs     10/13/21  0214 10/13/21  0721    143   K 4.4 4.5    107   CO2 28 27   BUN 27* 27*   CREATININE 1.6* 1.7*   CALCIUM 9.1 9.1     Recent Labs     10/13/21  0214   AST 12   ALT 7   BILITOT <0.2   ALKPHOS 113*     Coag Panel:   Recent Labs     10/13/21  0237   INR 0.94   PROTIME 12.8   APTT 27.5     Cardiac Enzymes:   Recent Labs     10/13/21  0214   TROPONINI <0.01     ABGs:No results found for: PHART, PO2ART, WJL5DVX  Urinalysis:  Lab Results   Component Value Date    NITRU POSITIVE 10/13/2021    WBCUA 10-15 10/13/2021    BACTERIA 3+ 10/13/2021    RBCUA 3-5 10/13/2021    BLOODU Negative 10/13/2021    SPECGRAV 1.028 10/13/2021    GLUCOSEU Negative 10/13/2021     A1C: No results for input(s): LABA1C in the last 72 hours. ABG:No results for input(s): PHART, LJG5ONO, PO2ART, SVJ4RHT, BEART, HGBAE, W5IKLMNF, CARBOXHGBART in the last 72 hours. XR ANKLE LEFT (2 VIEWS)    Result Date: 10/13/2021  EXAMINATION: XR ANKLE LEFT (2 VIEWS) 10/13/2021 8:41 AM HISTORY: Reduction of left ankle fracture dislocation. Report: AP and lateral views of the left ankle are compared with the x-rays performed approximately one hour earlier.  There is successful reduction of the ankle dislocation, with mild residual lateral subluxation of the talus relative to the tibia. There are fractures of the medial malleolus, distal shaft of the fibula, with mild residual displacement. No definite fracture of the posterior malleolus is seen. Successful reduction of the left ankle fracture dislocation, with mild residual lateral subluxation of the talus relative to the tibia. Signed by Dr Frantz Meza. Ambreen    XR ANKLE LEFT (MIN 3 VIEWS)    Result Date: 10/13/2021  EXAMINATION: XR ANKLE LEFT (MIN 3 VIEWS) 10/13/2021 8:35 AM HISTORY: Trauma, fracture. Report: 3 views of the left ankle were obtained. COMPARISON: There are no correlative imaging studies for comparison. There is acute fracture dislocation of the ankle, including a transverse fracture of the medial malleolus, oblique fracture of the distal shaft of the fibula, with medial angulation. On the lateral view, there is also anterior displacement of the distal tibia and a posterior malleolus fracture is likely. There is lateral subluxation at the tibiotalar joint, of approximately 2.4 cm. The fractures are closed. Extensive overlying soft tissue swelling is present. Acute fracture dislocation of the left ankle with displacement of the tibia relative to the talus measuring up to 2.4 cm. There are bimalleolar fractures, possible posterior malleolus fracture also. Signed by Dr Frantz Meza. Ambreen    XR FOOT LEFT (MIN 3 VIEWS)    Result Date: 10/13/2021  EXAMINATION: XR FOOT LEFT (MIN 3 VIEWS) 10/13/2021 8:43 AM HISTORY: Trauma, ankle fractures. Report: 3 views of the left foot were obtained. COMPARISON: There are no correlative imaging studies for comparison. Fractures of the distal shaft of the fibula and medial malleolus are described in detail in a separate report. There is extensive soft tissue swelling over the foot and ankle. The bony structures of the foot are intact. There is anterior subluxation of the tibia relative to the talus.  Inferior calcaneal enthesophyte is present. Left ankle fracture dislocation described in detail on separate reports. No acute fractures involving the bones of the foot. Extensive soft tissue swelling seen at the ankle and dorsal aspect of the foot. Signed by Dr Citlalli Christian. [place]    XR CHEST PORTABLE    Result Date: 10/13/2021  Examination. XR CHEST PORTABLE 10/13/2021 2:38 AM History: Preop evaluation. A single frontal portable upright view of the chest is obtained. There is no previous study for comparison. The lungs are moderately expanded. Left lung base is not optimally visualized or evaluated. Any process, infiltrate or consolidation is not excluded. The remaining visualized lungs are unremarkable without evidence of an infiltrate, pleural effusion, pulmonary congestion or pneumothorax. There is moderate cardiomegaly. Atheromatous changes thoracic aorta are noted. No acute bony abnormality. Left lung base is poorly evaluated due to suboptimal exposure. An infiltrate/consolidation in the left lower lung cannot be excluded. The remaining visualized lungs are unremarkable. A moderate cardiomegaly.  Signed by Dr Ally Ritchie      Assessment/Plan:  Principal Problem:    Open fracture of distal end of fibula and tibia, left, type I or II, initial encounter   -admit   -Ortho constult    -plan for ORIF today   -pain control   -PT/OT post-op, strict bedrest until post-op and ok with ortho   -gandhi, remove when applicable   -neurovascular checks   -Elevate extremity    Active Problems:    Chronic obstructive lung disease ([address]   -continue home inhalers   -monitor O2 saturation closely      Essential hypertension   -noted   -monitor BP closely   -resume home medications      Bipolar disorder ([address]   -noted   -resume home meds      Obstructive sleep apnea of adult   -noted   -CPAP at night      Chronic diastolic HF (heart failure) ([address]   -noted   -Bumex on hold for surgical intervention   -strict I&O   -daily weight      CKD (chronic kidney disease)   -monitor BMP   -gentle IV hydration while NPO   -Strict I&O    UTI   -Zosyn   -monitor I&O   -follow culture  DM   -A1c   -accu checks   -SSI   -hypoglycemia protocol in place    Resolved Problems:    * No resolved hospital problems.  *       Signed:  MIKAYLA Garcia - CNP, 10/13/2021 8:58 AM

## 2021-10-13 NOTE — ANESTHESIA PRE PROCEDURE
Department of Anesthesiology  Preprocedure Note       Name:  Deanne Sood   Age:  78 y.o.  :  1945                                          MRN:  [id number]         Date:  10/13/2021      Surgeon: Ramses Wright):  Yuliana Concepcion MD    Procedure: Procedure(s):  ANKLE OPEN REDUCTION INTERNAL FIXATION    Medications prior to admission:   Prior to Admission medications    Medication Sig Start Date End Date Taking? Authorizing Provider   OXYGEN Inhale 2 L into the lungs daily   Yes Historical Provider, MD   zolpidem (AMBIEN) 10 MG tablet Take by mouth nightly. Yes Historical Provider, MD   Biotin 5000 MCG TABS Take by mouth daily   Yes Historical Provider, MD   bumetanide (BUMEX) 1 MG tablet Take 1.5 mg by mouth daily   Yes Historical Provider, MD   clonazePAM (KLONOPIN) 0.5 MG tablet Take 0.5 mg by mouth nightly. Yes Historical Provider, MD   docusate sodium (COLACE) 100 MG capsule Take 100 mg by mouth daily   Yes Historical Provider, MD   escitalopram (LEXAPRO) 20 MG tablet Take 30 mg by mouth daily   Yes Historical Provider, MD   lamoTRIgine (LAMICTAL) 200 MG tablet Take 200 mg by mouth 2 times daily   Yes Historical Provider, MD   magnesium oxide (MAG-OX) 400 MG tablet Take 400 mg by mouth daily   Yes Historical Provider, MD   Melatonin 5 MG CAPS Take by mouth nightly   Yes Historical Provider, MD   metoprolol tartrate (LOPRESSOR) 25 MG tablet Take 25 mg by mouth 2 times daily   Yes Historical Provider, MD   oxyCODONE-acetaminophen (PERCOCET)  MG per tablet Take 1 tablet by mouth 3 times daily.    Yes Historical Provider, MD   pantoprazole (PROTONIX) 40 MG tablet Take 40 mg by mouth 2 times daily   Yes Historical Provider, MD   potassium chloride (KLOR-CON M) 20 MEQ TBCR extended release tablet Take 20 mEq by mouth 2 times daily   Yes Historical Provider, MD   Albuterol Sulfate, sensor, (PROAIR DIGIHALER) 108 (90 Base) MCG/ACT AEPB Inhale into the lungs   Yes Historical Provider, MD budesonide-formoterol (SYMBICORT) 160-4.5 MCG/ACT AERO Inhale 2 puffs into the lungs 2 times daily   Yes Historical Provider, MD   tamsulosin (FLOMAX) 0.4 MG capsule Take 0.4 mg by mouth daily   Yes Historical Provider, MD   topiramate (TOPAMAX) 50 MG tablet Take 50 mg by mouth 2 times daily   Yes Historical Provider, MD   calcium carbonate (TUMS) 500 MG chewable tablet Take 1 tablet by mouth daily   Yes Historical Provider, MD   vitamin D (ERGOCALCIFEROL) 1.25 MG (88244 UT) CAPS capsule Take 50,000 Units by mouth once a week   Yes Historical Provider, MD   acetaminophen (TYLENOL) 325 MG tablet Take 650 mg by mouth every 4 hours as needed for Pain    Historical Provider, MD       Current medications:    Current Facility-Administered Medications   Medication Dose Route Frequency Provider Last Rate Last Admin    [MAR Hold] piperacillin-tazobactam (ZOSYN) 3,375 mg in dextrose 5 % 50 mL IVPB (mini-bag)  3,375 mg IntraVENous Q6H Vandana Barger MD   Stopped at 10/13/21 1159    [MAR Hold] sodium chloride flush 0.9 % injection 5-40 mL  5-40 mL IntraVENous 2 times per day Lamine Brumfield MD   10 mL at 10/13/21 1009    [MAR Hold] sodium chloride flush 0.9 % injection 5-40 mL  5-40 mL IntraVENous PRN Lamine Brumfield MD        [facility]] 0.9 % sodium chloride infusion  25 mL IntraVENous PRN Lamine Brumfield MD        [Held by provider] enoxaparin (LOVENOX) injection 40 mg  40 mg SubCUTAneous Daily Lamine Brumfield MD        [facility] Hold] ondansetron (ZOFRAN-ODT) disintegrating tablet 4 mg  4 mg Oral Q8H PRN Lamine Brufmield MD        Or   [facility] Hold] ondansetron [facility]) injection 4 mg  4 mg IntraVENous Q6H PRN Lamine Brumfield MD        [facility] Hold] polyethylene glycol (GLYCOLAX) packet 17 g  17 g Oral Daily PRN Lamine Brumfield MD        [facility]] melatonin disintegrating tablet 5 mg  5 mg Oral Nightly PRN Lamine Brumfield MD        [facility]] calcium carbonate (TUMS) chewable tablet 500 mg  500 mg Oral TID PRN Lamine Brumfield, 0809    [MAR Hold] morphine (PF) injection 2 mg  2 mg IntraVENous Q3H PRN Mimi Kenny MD        [facility] Hold] morphine (PF) injection 1 mg  1 mg IntraVENous Q3H PRN Mimi Kenny MD   1 mg at 10/13/21 0753    [MAR Hold] glucose (GLUTOSE) 40 % oral gel 15 g  15 g Oral PRN Vadim Backers, APRN - CNP        [MAR Hold] dextrose 50 % IV solution  12.5 g IntraVENous PRN Vadim Backers, APRN - CNP        [MAR Hold] glucagon (rDNA) injection 1 mg  1 mg IntraMUSCular PRN Vadim Backers, APRN - CNP        [MAR Hold] dextrose 5 % solution  100 mL/hr IntraVENous PRN Vadim Backers, APRN - CNP        [MAR Hold] insulin lispro (HUMALOG) injection vial 0-6 Units  0-6 Units SubCUTAneous Q4H Dorcas Melendez, APRN - CNP        fentaNYL (SUBLIMAZE) injection 100 mcg  100 mcg IntraVENous Once Gladies MD Sandeep           Allergies:     Allergies   Allergen Reactions    Aripiprazole     Naloxone     Pentazocine        Problem List:    Patient Active Problem List   Diagnosis Code    Open fracture of distal end of fibula and tibia, left, type I or II, initial encounter S82.302B, S82.832B    Chronic obstructive lung disease ([address] J44.9    Essential hypertension I10    Nonrheumatic aortic valve stenosis I35.0    Bipolar disorder ([address] F31.9    Obesity E66.9    Obstructive sleep apnea of adult G47.33    Chronic diastolic HF (heart failure) ([facility] I50.32    CKD (chronic kidney disease) N18.9       Past Medical History:        Diagnosis Date    Anorexia nervosa     Anxiety     Aortic stenosis     Atherosclerotic heart disease     Back pain     Bipolar 1 disorder ([facility])     Chronic kidney disease     Cognitive communication deficit     COPD (chronic obstructive pulmonary disease) ([address]     Depression     Diabetes mellitus ([facility])     Difficulty walking     Drug induced constipation     Dysphagia     Edema     Heart disease     Heart failure ([address]     Hypertension     Obstructive sleep apnea     Repeated falls     Weakness        Past Surgical History:  History reviewed. No pertinent surgical history.     Social History:    Social History     Tobacco Use    Smoking status: Never Smoker    Smokeless tobacco: Never Used   Substance Use Topics    Alcohol use: Never                                Counseling given: Not Answered      Vital Signs (Current):   Vitals:    10/13/21 0619 10/13/21 0644 10/13/21 1115 10/13/21 1123   BP: (!) 142/76 (!) 131/53 94/71 110/70   Pulse: 75 77 80    Resp: 20 18 14    Temp: 98 °F (36.7 °C) 97.5 °F (36.4 °C) 98.2 °F (36.8 °C)    TempSrc: Oral Temporal Temporal    SpO2: 94% 99% 93%    Weight:                                                  BP Readings from Last 3 Encounters:   10/13/21 110/70       NPO Status: Time of last liquid consumption: 2350                        Time of last solid consumption: 2350                        Date of last liquid consumption: 10/12/21                        Date of last solid food consumption: 10/12/21    BMI:   Wt Readings from Last 3 Encounters:   10/13/21 217 lb (98.4 kg)     There is no height or weight on file to calculate BMI.    CBC:   Lab Results   Component Value Date    WBC 5.9 10/13/2021    RBC 3.97 10/13/2021    HGB 11.4 10/13/2021    HCT 38.1 10/13/2021    MCV 96.0 10/13/2021    RDW 13.0 10/13/2021     10/13/2021       CMP:   Lab Results   Component Value Date     10/13/2021    K 4.5 10/13/2021     10/13/2021    CO2 27 10/13/2021    BUN 27 10/13/2021    CREATININE 1.7 10/13/2021    GFRAA 36 10/13/2021    LABGLOM 29 10/13/2021    GLUCOSE 136 10/13/2021    PROT 7.3 10/13/2021    CALCIUM 9.1 10/13/2021    BILITOT <0.2 10/13/2021    ALKPHOS 113 10/13/2021    AST 12 10/13/2021    ALT 7 10/13/2021       POC Tests:   Recent Labs     10/13/21  0955   POCGLU 110*       Coags:   Lab Results   Component Value Date    PROTIME 12.8 10/13/2021    INR 0.94 10/13/2021    APTT 27.5 10/13/2021       HCG (If Applicable): No results found for: PREGTESTUR, PREGSERUM, HCG, HCGQUANT     ABGs: No results found for: PHART, PO2ART, JZT4YAH, ALG1LWK, BEART, V1EDTLEW     Type & Screen (If Applicable):  No results found for: LABABO, LABRH    Drug/Infectious Status (If Applicable):  No results found for: HIV, HEPCAB    COVID-19 Screening (If Applicable):   Lab Results   Component Value Date    COVID19 Not Detected 10/13/2021           Anesthesia Evaluation  Patient summary reviewed no history of anesthetic complications:   Airway: Mallampati: I  TM distance: >3 FB   Neck ROM: full  Mouth opening: > = 3 FB Dental:    (+) edentulous      Pulmonary:normal exam  breath sounds clear to auscultation  (+) COPD (uses home O2):  sleep apnea (Uses NCO2):      (-) asthma, recent URI and not a current smoker          Patient did not smoke on day of surgery. Cardiovascular:  Exercise tolerance: poor (<4 METS), Denies chest pain with exertion  (+) hypertension:, past MI (questionable per patient, 6 yrs ago):, CAD:,     (-) pacemaker, CABG/stent and  angina    ECG reviewed  Rhythm: regular  Rate: normal           Beta Blocker:  Dose within 24 Hrs         Neuro/Psych:   (+) psychiatric history (Depression, Bipolar):   (-) seizures, TIA and CVA           GI/Hepatic/Renal:        (-) GERD, liver disease and no renal disease       Endo/Other:    (+) DiabetesType II DM, , .    (-) hypothyroidism, hyperthyroidism               Abdominal:   (+) obese,           Vascular:     - DVT. Other Findings:           Anesthesia Plan      general     ASA 2     (Popliteal, Adductor canal block  Preop famotidine)  Induction: intravenous. MIPS: Postoperative opioids intended and Prophylactic antiemetics administered. Anesthetic plan and risks discussed with patient. Use of blood products discussed with patient whom consented to blood products.                    Rand Dyson MD   10/13/2021

## 2021-10-14 VITALS
SYSTOLIC BLOOD PRESSURE: 119 MMHG | WEIGHT: 229.06 LBS | TEMPERATURE: 97 F | BODY MASS INDEX: 42.15 KG/M2 | DIASTOLIC BLOOD PRESSURE: 42 MMHG | HEIGHT: 62 IN | RESPIRATION RATE: 16 BRPM | OXYGEN SATURATION: 94 % | HEART RATE: 65 BPM

## 2021-10-14 LAB
ANION GAP SERPL CALCULATED.3IONS-SCNC: 8 MMOL/L (ref 7–19)
BUN BLDV-MCNC: 24 MG/DL (ref 8–23)
CALCIUM SERPL-MCNC: 8.9 MG/DL (ref 8.8–10.2)
CHLORIDE BLD-SCNC: 109 MMOL/L (ref 98–111)
CO2: 23 MMOL/L (ref 22–29)
CREAT SERPL-MCNC: 1.5 MG/DL (ref 0.5–0.9)
GFR AFRICAN AMERICAN: 41
GFR NON-AFRICAN AMERICAN: 34
GLUCOSE BLD-MCNC: 117 MG/DL (ref 70–99)
GLUCOSE BLD-MCNC: 127 MG/DL (ref 74–109)
GLUCOSE BLD-MCNC: 135 MG/DL (ref 70–99)
GLUCOSE BLD-MCNC: 148 MG/DL (ref 70–99)
GLUCOSE BLD-MCNC: 94 MG/DL (ref 70–99)
HCT VFR BLD CALC: 32 % (ref 37–47)
HEMOGLOBIN: 9.5 G/DL (ref 12–16)
PERFORMED ON: ABNORMAL
PERFORMED ON: NORMAL
POTASSIUM SERPL-SCNC: 5.3 MMOL/L (ref 3.5–5)
SODIUM BLD-SCNC: 140 MMOL/L (ref 136–145)

## 2021-10-14 PROCEDURE — 85018 HEMOGLOBIN: CPT

## 2021-10-14 PROCEDURE — 2580000003 HC RX 258: Performed by: ORTHOPAEDIC SURGERY

## 2021-10-14 PROCEDURE — 80048 BASIC METABOLIC PNL TOTAL CA: CPT

## 2021-10-14 PROCEDURE — 97535 SELF CARE MNGMENT TRAINING: CPT

## 2021-10-14 PROCEDURE — 85014 HEMATOCRIT: CPT

## 2021-10-14 PROCEDURE — 36415 COLL VENOUS BLD VENIPUNCTURE: CPT

## 2021-10-14 PROCEDURE — 6360000002 HC RX W HCPCS: Performed by: ORTHOPAEDIC SURGERY

## 2021-10-14 PROCEDURE — 94640 AIRWAY INHALATION TREATMENT: CPT

## 2021-10-14 PROCEDURE — 2700000000 HC OXYGEN THERAPY PER DAY

## 2021-10-14 PROCEDURE — 97110 THERAPEUTIC EXERCISES: CPT

## 2021-10-14 PROCEDURE — 6370000000 HC RX 637 (ALT 250 FOR IP): Performed by: ORTHOPAEDIC SURGERY

## 2021-10-14 PROCEDURE — 97162 PT EVAL MOD COMPLEX 30 MIN: CPT

## 2021-10-14 PROCEDURE — 97530 THERAPEUTIC ACTIVITIES: CPT

## 2021-10-14 PROCEDURE — 97166 OT EVAL MOD COMPLEX 45 MIN: CPT

## 2021-10-14 PROCEDURE — 82947 ASSAY GLUCOSE BLOOD QUANT: CPT

## 2021-10-14 RX ORDER — CHOLECALCIFEROL (VITAMIN D3) 50 MCG
2000 TABLET ORAL DAILY
Qty: 60 TABLET | Refills: 0 | Status: SHIPPED | OUTPATIENT
Start: 2021-10-15

## 2021-10-14 RX ORDER — ONDANSETRON 4 MG/1
4 TABLET, ORALLY DISINTEGRATING ORAL EVERY 8 HOURS PRN
Qty: 9 TABLET | Refills: 0 | Status: SHIPPED | OUTPATIENT
Start: 2021-10-14

## 2021-10-14 RX ORDER — CEFDINIR 300 MG/1
300 CAPSULE ORAL 2 TIMES DAILY
Qty: 20 CAPSULE | Refills: 0 | Status: SHIPPED | OUTPATIENT
Start: 2021-10-14 | End: 2021-10-24

## 2021-10-14 RX ORDER — OXYCODONE AND ACETAMINOPHEN 10; 325 MG/1; MG/1
1 TABLET ORAL 3 TIMES DAILY
Qty: 9 TABLET | Refills: 0 | Status: SHIPPED | OUTPATIENT
Start: 2021-10-14 | End: 2021-10-17

## 2021-10-14 RX ORDER — CLONAZEPAM 0.5 MG/1
0.5 TABLET ORAL NIGHTLY
Qty: 3 TABLET | Refills: 0 | Status: SHIPPED | OUTPATIENT
Start: 2021-10-14 | End: 2021-10-17

## 2021-10-14 RX ADMIN — DOCUSATE SODIUM 100 MG: 100 CAPSULE ORAL at 08:44

## 2021-10-14 RX ADMIN — OXYCODONE 10 MG: 5 TABLET ORAL at 04:02

## 2021-10-14 RX ADMIN — Medication 2000 UNITS: at 08:45

## 2021-10-14 RX ADMIN — METOPROLOL TARTRATE 25 MG: 50 TABLET, FILM COATED ORAL at 08:44

## 2021-10-14 RX ADMIN — OXYCODONE 10 MG: 5 TABLET ORAL at 16:06

## 2021-10-14 RX ADMIN — ARFORMOTEROL TARTRATE 15 MCG: 15 SOLUTION RESPIRATORY (INHALATION) at 06:48

## 2021-10-14 RX ADMIN — LAMOTRIGINE 200 MG: 200 TABLET ORAL at 08:43

## 2021-10-14 RX ADMIN — SODIUM CHLORIDE, PRESERVATIVE FREE 10 ML: 5 INJECTION INTRAVENOUS at 08:46

## 2021-10-14 RX ADMIN — PIPERACILLIN AND TAZOBACTAM 3375 MG: 3; .375 INJECTION, POWDER, LYOPHILIZED, FOR SOLUTION INTRAVENOUS at 06:30

## 2021-10-14 RX ADMIN — APIXABAN 2.5 MG: 2.5 TABLET, FILM COATED ORAL at 08:41

## 2021-10-14 RX ADMIN — ESCITALOPRAM OXALATE 30 MG: 10 TABLET ORAL at 08:51

## 2021-10-14 RX ADMIN — TOPIRAMATE 50 MG: 50 TABLET, FILM COATED ORAL at 08:42

## 2021-10-14 RX ADMIN — BUDESONIDE 500 MCG: 0.5 SUSPENSION RESPIRATORY (INHALATION) at 06:48

## 2021-10-14 RX ADMIN — PIPERACILLIN AND TAZOBACTAM 3375 MG: 3; .375 INJECTION, POWDER, LYOPHILIZED, FOR SOLUTION INTRAVENOUS at 02:10

## 2021-10-14 RX ADMIN — ANTACID TABLETS 500 MG: 500 TABLET, CHEWABLE ORAL at 08:43

## 2021-10-14 RX ADMIN — SODIUM CHLORIDE: 9 INJECTION, SOLUTION INTRAVENOUS at 02:26

## 2021-10-14 RX ADMIN — Medication 400 MG: at 08:43

## 2021-10-14 RX ADMIN — HYDROMORPHONE HYDROCHLORIDE 0.5 MG: 1 INJECTION, SOLUTION INTRAMUSCULAR; INTRAVENOUS; SUBCUTANEOUS at 06:19

## 2021-10-14 RX ADMIN — POTASSIUM CHLORIDE 20 MEQ: 1500 TABLET, EXTENDED RELEASE ORAL at 08:44

## 2021-10-14 RX ADMIN — OXYCODONE 10 MG: 5 TABLET ORAL at 08:42

## 2021-10-14 RX ADMIN — TAMSULOSIN HYDROCHLORIDE 0.4 MG: 0.4 CAPSULE ORAL at 08:44

## 2021-10-14 RX ADMIN — APIXABAN 2.5 MG: 2.5 TABLET, FILM COATED ORAL at 03:47

## 2021-10-14 RX ADMIN — PANTOPRAZOLE SODIUM 40 MG: 40 TABLET, DELAYED RELEASE ORAL at 08:43

## 2021-10-14 ASSESSMENT — PAIN DESCRIPTION - ONSET: ONSET: ON-GOING

## 2021-10-14 ASSESSMENT — PAIN SCALES - GENERAL
PAINLEVEL_OUTOF10: 6
PAINLEVEL_OUTOF10: 5
PAINLEVEL_OUTOF10: 7
PAINLEVEL_OUTOF10: 7
PAINLEVEL_OUTOF10: 5
PAINLEVEL_OUTOF10: 3
PAINLEVEL_OUTOF10: 6

## 2021-10-14 ASSESSMENT — PAIN DESCRIPTION - PAIN TYPE
TYPE: SURGICAL PAIN
TYPE: ACUTE PAIN;SURGICAL PAIN
TYPE: ACUTE PAIN;SURGICAL PAIN
TYPE: SURGICAL PAIN

## 2021-10-14 ASSESSMENT — PAIN DESCRIPTION - LOCATION
LOCATION: LEG
LOCATION: LEG;ANKLE

## 2021-10-14 ASSESSMENT — PAIN DESCRIPTION - FREQUENCY: FREQUENCY: INTERMITTENT

## 2021-10-14 ASSESSMENT — ENCOUNTER SYMPTOMS
COUGH: 0
RHINORRHEA: 0
NAUSEA: 0
BACK PAIN: 0
SHORTNESS OF BREATH: 0
ABDOMINAL PAIN: 0
VOMITING: 0

## 2021-10-14 ASSESSMENT — PAIN DESCRIPTION - ORIENTATION
ORIENTATION: LEFT

## 2021-10-14 ASSESSMENT — PAIN DESCRIPTION - DESCRIPTORS
DESCRIPTORS: DISCOMFORT;ACHING
DESCRIPTORS: DISCOMFORT;ACHING
DESCRIPTORS: ACHING;DISCOMFORT

## 2021-10-14 ASSESSMENT — PAIN - FUNCTIONAL ASSESSMENT
PAIN_FUNCTIONAL_ASSESSMENT: PREVENTS OR INTERFERES SOME ACTIVE ACTIVITIES AND ADLS
PAIN_FUNCTIONAL_ASSESSMENT: PREVENTS OR INTERFERES WITH ALL ACTIVE AND SOME PASSIVE ACTIVITIES

## 2021-10-14 NOTE — CARE COORDINATION
HH referral received. Per chart, patient will return to SNF upon discharge.   No further HH needs identified Electronically signed by Saud Ronquillo on 10/14/21 at 8:49 AM CDT

## 2021-10-14 NOTE — DISCHARGE SUMMARY
Karine Mehta  :  1945  MRN:  [id number]    Admit date:  10/13/2021  Discharge date:  10/14/21    Discharging Physician:  Dr. Tunde Romano Directive: Full Code    Consults: [address] [place] [address]     Primary Care Physician:  Anupam Santoyo    Discharge Diagnoses:  Principal Problem:    Open fracture of distal end of fibula and tibia, left, type I or II, initial encounter  Active Problems:    Chronic obstructive lung disease [facility]    Essential hypertension    Bipolar disorder ([facility]    Obstructive sleep apnea of adult    Chronic diastolic HF (heart failure) (HCC)    CKD (chronic kidney disease)  Resolved Problems:    * No resolved hospital problems. *      Portions of this note have been copied forward, however, changed to reflect the most current clinical status of this patient. Hospital Course: The patient is a 78 y.o. female with a history of CKD, COPD, DM, Bipolar, and heart failure who presented to [address] [place] [address] ED complaining of a fall with left ankle pain. She stated she was recently started on Bumex and has been urinating frequently. She stated she spilled a glass of water and slipped on the water after leaving the bathroom. She had immediate pain to the left ankle and noted open skin. The patient has chronic pain and shortness of breath that she stated were no worse than normal. With frequency she also endorsed urgency and times of incontinence. She denied chest pain or palpitations. She denied LOC or striking her head during the fall. Patient was placed on Zosyn for UTI and will discharge on Omnicef 300 mg BID PO for 10 days. She is POD#1 of ORIF Left ankle. Her splint cast is dry and intact. She will follow up outpatient with Dr. Félix Garcia. Patient is medically stable to discharge back to SNF.     Significant Diagnostic Studies:   XR ANKLE LEFT (2 VIEWS)    Result Date: 10/13/2021  EXAMINATION: XR ANKLE LEFT (2 VIEWS) 10/13/2021 8:41 AM HISTORY: Reduction of left ankle fracture dislocation. Report: AP and lateral views of the left ankle are compared with the x-rays performed approximately one hour earlier. There is successful reduction of the ankle dislocation, with mild residual lateral subluxation of the talus relative to the tibia. There are fractures of the medial malleolus, distal shaft of the fibula, with mild residual displacement. No definite fracture of the posterior malleolus is seen. Successful reduction of the left ankle fracture dislocation, with mild residual lateral subluxation of the talus relative to the tibia. Signed by Dr Dequan Pelletier. Ambreen    XR ANKLE LEFT (MIN 3 VIEWS)    Result Date: 10/13/2021  EXAMINATION: XR ANKLE LEFT (MIN 3 VIEWS) 10/13/2021 4:57 PM HISTORY: ORIF left ankle fractures. Fluoroscopy time: 108 seconds. Radiation dose: 3.71755 mGym2 Number of fluoroscopic images acquired: 3. Report: Intraoperative fluoroscopic spot views were obtained under the supervision of the orthopedic surgery service, demonstrating open reduction internal fixation of the bimalleolar fractures, with satisfactory hardware positioning and alignment. Images are stored in PACS for review. Signed by Dr Dequan Pelletier. Ambreen    XR ANKLE LEFT (MIN 3 VIEWS)    Result Date: 10/13/2021  EXAMINATION: XR ANKLE LEFT (MIN 3 VIEWS) 10/13/2021 8:35 AM HISTORY: Trauma, fracture. Report: 3 views of the left ankle were obtained. COMPARISON: There are no correlative imaging studies for comparison. There is acute fracture dislocation of the ankle, including a transverse fracture of the medial malleolus, oblique fracture of the distal shaft of the fibula, with medial angulation. On the lateral view, there is also anterior displacement of the distal tibia and a posterior malleolus fracture is likely. There is lateral subluxation at the tibiotalar joint, of approximately 2.4 cm. The fractures are closed.  Extensive overlying soft tissue swelling is present. Acute fracture dislocation of the left ankle with displacement of the tibia relative to the talus measuring up to 2.4 cm. There are bimalleolar fractures, possible posterior malleolus fracture also. Signed by Dr Saba Fenton. Ambreen    XR FOOT LEFT (MIN 3 VIEWS)    Result Date: 10/13/2021  EXAMINATION: XR FOOT LEFT (MIN 3 VIEWS) 10/13/2021 8:43 AM HISTORY: Trauma, ankle fractures. Report: 3 views of the left foot were obtained. COMPARISON: There are no correlative imaging studies for comparison. Fractures of the distal shaft of the fibula and medial malleolus are described in detail in a separate report. There is extensive soft tissue swelling over the foot and ankle. The bony structures of the foot are intact. There is anterior subluxation of the tibia relative to the talus. Inferior calcaneal enthesophyte is present. Left ankle fracture dislocation described in detail on separate reports. No acute fractures involving the bones of the foot. Extensive soft tissue swelling seen at the ankle and dorsal aspect of the foot. Signed by Dr Saba Fenton. Ambreen    XR CHEST PORTABLE    Result Date: 10/13/2021  Examination. XR CHEST PORTABLE 10/13/2021 2:38 AM History: Preop evaluation. A single frontal portable upright view of the chest is obtained. There is no previous study for comparison. The lungs are moderately expanded. Left lung base is not optimally visualized or evaluated. Any process, infiltrate or consolidation is not excluded. The remaining visualized lungs are unremarkable without evidence of an infiltrate, pleural effusion, pulmonary congestion or pneumothorax. There is moderate cardiomegaly. Atheromatous changes thoracic aorta are noted. No acute bony abnormality. Left lung base is poorly evaluated due to suboptimal exposure. An infiltrate/consolidation in the left lower lung cannot be excluded. The remaining visualized lungs are unremarkable. A moderate cardiomegaly.  Signed by Dr Ayush Padgett Anwer      Pertinent Labs:   CBC:   Recent Labs     10/13/21  0237 10/14/21  0150   WBC 5.9  --    HGB 11.4* 9.5*     --      BMP:    Recent Labs     10/13/21  0214 10/13/21  0721 10/14/21  0150    143 140   K 4.4 4.5 5.3*    107 109   CO2 28 27 23   BUN 27* 27* 24*   CREATININE 1.6* 1.7* 1.5*   GLUCOSE 120* 136* 127*     INR:   Recent Labs     10/13/21  0237   INR 0.94       Physical Exam:  Vital Signs: BP (!) 119/42   Pulse 65   Temp 97 °F (36.1 °C) (Temporal)   Resp 16   Ht 5' 2\" (1.575 m)   Wt 229 lb 1 oz (103.9 kg)   SpO2 94%   BMI 41.90 kg/m²   Physical Exam  Vitals and nursing note reviewed. Constitutional:       Appearance: She is obese. HENT:      Head: Normocephalic. Mouth/Throat:      Mouth: Mucous membranes are dry. Pharynx: Oropharynx is clear. Eyes:      Extraocular Movements: Extraocular movements intact. Pupils: Pupils are equal, round, and reactive to light. Cardiovascular:      Rate and Rhythm: Normal rate and regular rhythm. Pulses: Normal pulses. Heart sounds: Normal heart sounds. Pulmonary:      Effort: Pulmonary effort is normal.      Breath sounds: Normal breath sounds. Abdominal:      General: Bowel sounds are normal. There is no distension. Palpations: Abdomen is soft. Tenderness: There is no abdominal tenderness. There is no guarding. Musculoskeletal:         General: Swelling and signs of injury (splint cast in place to LLE) present. Cervical back: Normal range of motion and neck supple. Skin:     General: Skin is warm and dry. Capillary Refill: Capillary refill takes less than 2 seconds. Neurological:      General: No focal deficit present. Mental Status: She is alert and oriented to person, place, and time.          Discharge Medications:       Madeline Denson   Home Medication Instructions FFT:[id number]    Printed on:10/14/21 2215   Medication Information                      acetaminophen (TYLENOL) 325 MG tablet  Take 650 mg by mouth every 4 hours as needed for Pain             Albuterol Sulfate, sensor, (PROAIR DIGIHALER) 108 (90 Base) MCG/ACT AEPB  Inhale into the lungs             apixaban (ELIQUIS) 2.5 MG TABS tablet  Take 1 tablet by mouth 2 times daily             Biotin 5000 MCG TABS  Take by mouth daily             budesonide-formoterol (SYMBICORT) 160-4.5 MCG/ACT AERO  Inhale 2 puffs into the lungs 2 times daily             bumetanide (BUMEX) 1 MG tablet  Take 1.5 mg by mouth daily             calcium carbonate (TUMS) 500 MG chewable tablet  Take 1 tablet by mouth every 6 hours as needed              cefdinir (OMNICEF) 300 MG capsule  Take 1 capsule by mouth 2 times daily for 10 days             Cholecalciferol (VITAMIN D3) 125 MCG (5000 UT) TABS  Take 1 tablet by mouth daily             clonazePAM (KLONOPIN) 0.5 MG tablet  Take 0.5 mg by mouth nightly. diclofenac sodium (VOLTAREN) 1 % GEL  Apply topically 4 times daily as needed for Pain Apply to affected area for pain as needed             docusate sodium (COLACE) 100 MG capsule  Take 100 mg by mouth daily             escitalopram (LEXAPRO) 20 MG tablet  Take 30 mg by mouth daily             lamoTRIgine (LAMICTAL) 200 MG tablet  Take 200 mg by mouth 2 times daily             magnesium oxide (MAG-OX) 400 MG tablet  Take 400 mg by mouth daily             Melatonin 5 MG CAPS  Take by mouth nightly             metoprolol tartrate (LOPRESSOR) 25 MG tablet  Take 25 mg by mouth 2 times daily             nitroGLYCERIN (NITROSTAT) 0.4 MG SL tablet  Place 0.4 mg under the tongue every 5 minutes as needed for Chest pain up to max of 3 total doses. If no relief after 1 dose, call 911. ondansetron (ZOFRAN-ODT) 4 MG disintegrating tablet  Take 1 tablet by mouth every 8 hours as needed for Nausea or Vomiting             oxyCODONE-acetaminophen (PERCOCET)  MG per tablet  Take 1 tablet by mouth 3 times daily. OXYGEN  Inhale 2 L into the lungs daily             pantoprazole (PROTONIX) 40 MG tablet  Take 40 mg by mouth 2 times daily             polyethylene glycol (GLYCOLAX) 17 g packet  Take 17 g by mouth daily as needed for Constipation             potassium chloride (KLOR-CON M) 20 MEQ TBCR extended release tablet  Take 20 mEq by mouth 2 times daily             tamsulosin (FLOMAX) 0.4 MG capsule  Take 0.4 mg by mouth daily             topiramate (TOPAMAX) 50 MG tablet  Take 50 mg by mouth 2 times daily             Vitamin D (CHOLECALCIFEROL) 50 MCG (2000 UT) TABS tablet  Take 1 tablet by mouth daily             zolpidem (AMBIEN) 10 MG tablet  Take by mouth nightly. Discharge Instructions: Follow up with Shanna Challenger in 5-7 days after DC from facility. Take medications as directed. Resume activity as tolerated. Diet: Adult Oral Nutrition Supplement; Standard High Calorie/High Protein Oral Supplement  ADULT DIET; Regular     Disposition: Patient is Stableand will be discharged to [address] [place] [address]. Time spent on discharge 39 minutes spent in assessing patient, reviewing medications, discussion with nursing, confirming safe discharge plan and preparation of discharge summary.     Signed:  MIKAYLA Wills - ISAIAH, 10/14/2021 1:55 PM

## 2021-10-14 NOTE — OP NOTE
MAT [employer] [facility] [address] [facility]                                OPERATIVE REPORT    PATIENT NAME: Donte Carter                     :        1945  MED REC NO:   [id number]                              ROOM:       [facility]  ACCOUNT NO:   [de-identified]                           ADMIT DATE: 10/13/2021  PROVIDER:     Lacey Whaley MD    DATE OF PROCEDURE:  10/13/2021    PREOPERATIVE DIAGNOSIS:  Grade 1 open left bimalleolar ankle fracture. POSTOPERATIVE DIAGNOSIS:  Grade 1 open left bimalleolar ankle fracture. PROCEDURE PERFORMED:  1.  I and D of grade 1 open bimalleolar ankle fracture. 2.  ORIF of bimalleolar ankle fracture. SURGEON:  Lacey Whaley MD    FIRST ASSISTANT:  Prieto Gifford PA-C. Please note, he is a critical  assistant in exposure and placement of implants. ANESTHESIA:  General.    EBL:  Less than 100 mL. FLUIDS:  1000 mL of crystalloid. TOURNIQUET TIME:  64 minutes. COMPONENTS USED:  Synthes small fragment set with the use of on the  fibula, a 10-hole one-third tubular locking plate with three distal  locking screws and three cortical 3.5 screws proximally. Also used were  two 2.4 lag screws, medially was fixed with two 40 mm 4.0 cannulated  screws. FINDINGS:  A pinpoint open skin lesion near the medial malleolus and the  medial side of the tibia. INDICATIONS:  A 68-year-old lady who suffered the above-stated fracture. Yesterday, she was reduced in the ER, given Ancef as well as Zosyn per  our open fracture protocol. Given the nature of this injury, it was  recommended that she undergo the above-stated procedure. OPERATIVE PROCEDURE:  After informed consent, she was given additional 2  gm of Ancef, underwent general anesthesia. Tourniquet was placed around  the left upper thigh. A bump was placed on the left upper hip. Bone  Foam was used. The right leg was placed in SCD.   Left leg was prepped  and

## 2021-10-14 NOTE — PROGRESS NOTES
Subjective:     Post-Operative Day: 1 Status Post left grade 1 bimalleolar ankle fracture treated with I&D and ORIF of bimalleolar ankle fracture. She is awake and alert. Some pain but better than yesterday. No new complaints. Systemic or Specific Complaints: No Complaints  no nausea and no vomiting Pain 4    Objective:     Patient Vitals for the past 24 hrs:   BP Temp Temp src Pulse Resp SpO2 Weight   10/14/21 0652 (!) 139/56 97.2 °F (36.2 °C) Temporal 60 18 93 %    10/14/21 0012 (!) 120/47 96.8 °F (36 °C) Temporal 62 16 93 % 229 lb 1 oz (103.9 kg)   10/13/21 2111 (!) 135/57   68      10/13/21 2026 (!) 126/50   67  93 %    10/13/21 1815 (!) 145/66 98.9 °F (37.2 °C)  71 16 95 %    10/13/21 1700 (!) 150/67 97.3 °F (36.3 °C)  74 18 94 %    10/13/21 1650 134/70 97.6 °F (36.4 °C) Temporal 73 21 92 %    10/13/21 1645 134/70   73 17 94 %    10/13/21 1640 (!) 148/80   74 15 94 %    10/13/21 1635 (!) 147/80   76 14 97 %    10/13/21 1630 (!) 139/98   77 17 97 %    10/13/21 1625 (!) 157/74   81 19 96 %    10/13/21 1624 (!) 157/74 99.7 °F (37.6 °C) Temporal 79 19 95 %    10/13/21 1622 (!) 152/75 99.4 °F (37.4 °C) Temporal 84 24 (!) 87 %    10/13/21 1123 110/70         10/13/21 1115 94/71 98.2 °F (36.8 °C) Temporal 80 14 93 %        General: alert, appears stated age, cooperative, no distress and moderately obese   Exam: Splint to left lower extremity intact, clean and dry. She able to flex/ext toes. Wound: Wound clean and dry no evidence of infection. , No Drainage and Wound Intact   Neurovascular: Exam normal     DVT Exam: No evidence of DVT seen on physical exam.   Xray:  left ankle showed good reduction of the bimalleolar ankle fracture with intact fibular plate and screws and two medial screws.        Data Review:  Recent Labs     10/13/21  0237 10/14/21  0150   HGB 11.4* 9.5*     Recent Labs     10/14/21  0150      K 5.3*   CREATININE 1.5*     Recent Labs     10/14/21  0150 LABGLOM 34*     Recent Labs     10/13/21  0237   INR 0.94     Assessment:     Status Post left grade 1 open bimalleolar ankle fracture treated with I&D and ORIF. Doing well postoperatively. Plan: We will keep her non weight bearing to left lower extremity for 6 weeks. She will have f/u in office in 2 weeks for removal of splint and sutures and new cast application.       Electronically signed by Fermin Hermosillo PA-C on 10/14/2021 at 7:39 AM

## 2021-10-14 NOTE — PROGRESS NOTES
Report called to [facility], nursing at [facility]. IV access discontinued. Patient to continue on Omnicef PO. She will need to follow up in 2 weeks with Dr. Pavel Benitez. Non-wt bearing for 6 weeks.

## 2021-10-14 NOTE — PROGRESS NOTES
Occupational Therapy Initial Assessment  Date: 10/14/2021   Patient Name: Dallin Martin  MRN: [id number]     : 1945    Date of Service: 10/14/2021    Discharge Recommendations:  Patient would benefit from continued therapy after discharge, 24 hour supervision or assist     Assessment   Assessment: OT evaluation completed and tx initiated. Pt may benefit from continued skilled services to address functional deficits and prevent additional falls. Pt may see improvement with further tx. Pt presents as a high fall risk due to prior history and current functional impairments. OT agrees with tentative plan to D/C back to SNF. Prognosis: Good  Decision Making: Low Complexity  OT Education: Precautions; Equipment;ADL Adaptive Strategies;Transfer Training;IADL Safety; Energy Conservation  Patient Education: Recommending repetition. REQUIRES OT FOLLOW UP: Yes  Activity Tolerance  Activity Tolerance: Patient Tolerated treatment well  Activity Tolerance: Agreeable but easily distracted; would need redirection for perseveration  Safety Devices  Safety Devices in place: Yes  Type of devices: Bed alarm in place;Call light within reach; Left in bed         Patient Diagnosis(es): The primary encounter diagnosis was Type I or II open fracture of left tibia and fibula, initial encounter. Diagnoses of Fall, initial encounter and Acute urinary tract infection were also pertinent to this visit. has a past medical history of Anorexia nervosa, Anxiety, Aortic stenosis, Atherosclerotic heart disease, Back pain, Bipolar 1 disorder ([address] Chronic kidney disease, Cognitive communication deficit, COPD (chronic obstructive pulmonary disease) ([address] Depression, Diabetes mellitus ([address] Difficulty walking, Drug induced constipation, Dysphagia, Edema, Heart disease, Heart failure ([address] Hypertension, Obstructive sleep apnea, Repeated falls, and Weakness. has a past surgical history that includes Ankle fracture surgery (Left, 10/13/2021). Restrictions  Restrictions/Precautions  Restrictions/Precautions: Weight Bearing, Fall Risk  Required Braces or Orthoses?: No  Lower Extremity Weight Bearing Restrictions  Left Lower Extremity Weight Bearing: Non Weight Bearing    Subjective   General  Chart Reviewed: Yes  Patient assessed for rehabilitation services?: Yes  Additional Pertinent Hx: DM; Bipolar type 1; DM; HTN; repeated falls; cog communication deficit; CKD; back pain; anxiety; anorexia  Family / Caregiver Present: No  Diagnosis: (L) open distal tib, fib fx; ORIF (L) ankle  Patient Currently in Pain: Yes  Pain Assessment  Pain Assessment: 0-10  Pain Level: 5  Pain Type: Acute pain;Surgical pain  Pain Location: Leg;Ankle  Pain Orientation: Left  Pain Descriptors: Discomfort;Aching  Pain Frequency: Intermittent  Pain Onset: On-going  Functional Pain Assessment: Prevents or interferes some active activities and ADLs  Non-Pharmaceutical Pain Intervention(s): Ambulation/Increased Activity;Repositioned;Elevation  Response to Pain Intervention: Patient Satisfied  Vital Signs  Level of Consciousness: Alert (0)  Patient Currently in Pain: Yes    Social/Functional History  Social/Functional History  Lives With:  (has a roommate)  Type of Home: Facility ([facility])  Home Equipment: 4 wheeled walker  ADL Assistance: Needs assistance (sponge baths and A with LB dressing)  Ambulation Assistance: Independent (with rollator)  Transfer Assistance: Independent (with rollator)  Additional Comments: pt vague on descriptions of prior functional mobility     Objective   Vision: Within Functional Limits  Vision Exceptions: Wears glasses for reading  Hearing: Within functional limits    Orientation  Overall Orientation Status: Within Functional Limits  Observation/Palpation  Posture: Fair  Observation: hard cast LLE, gandhi, 2LO2  Balance  Sitting Balance: Supervision  Toilet Transfers  Toilet Transfers Comments: Currently bedlevel for toileting; is NWB on LLE; scooting might need improvement for sliding-board trns for [facility] use  ADL  Feeding: Independent;Setup  Grooming: Contact guard assistance;Setup  UE Bathing: Minimal assistance  LE Bathing: Maximum assistance  UE Dressing: Contact guard assistance;Minimal assistance  LE Dressing: Maximum assistance  Toileting: Maximum assistance        Bed mobility  Supine to Sit: Maximum assistance;2 Person assistance  Sit to Supine: Maximum assistance;2 Person assistance  Scooting: Minimal assistance        Cognition  Overall Cognitive Status: Exceptions  Arousal/Alertness: Appropriate responses to stimuli  Following Commands: Follows one step commands with increased time; Follows one step commands with repetition  Attention Span: Difficulty attending to directions; Attends with cues to redirect  Memory: Decreased recall of precautions;Decreased recall of recent events  Safety Judgement: Decreased awareness of need for assistance;Decreased awareness of need for safety  Problem Solving: Assistance required to generate solutions;Assistance required to correct errors made;Assistance required to implement solutions;Assistance required to identify errors made;Decreased awareness of errors  Insights: Decreased awareness of deficits  Initiation: Requires cues for all  Sequencing: Requires cues for all        Sensation  Overall Sensation Status: WFL (for BUE; per pt report)      LUE AROM (degrees)  LUE AROM : WFL  RUE AROM (degrees)  RUE AROM : WFL  LUE Strength  LUE Strength Comment: Grossly 3+/5  RUE Strength  RUE Strength Comment: Grossly 3+/5           Included Treatment  Tx consisted of: extensive bed mobility; pt education; transfer training; DME training; activity tolerance/sitting balance challenges; unilateral standing (attempts); positioning; and LB dressing.    (Total therapy time: 40 min)        Plan   Plan  Times per week: 3-5  Current Treatment Recommendations: Strengthening, Wheelchair Mobility Training, Pain Management, Positioning, ROM, Safety Education & Training, Balance Training, Patient/Caregiver Education & Training, Self-Care / ADL, Cognitive/Perceptual Training, Functional Mobility Training, Equipment Evaluation, Education, & procurement, Home Management Training, Endurance Training    Goals  Short term goals  Time Frame for Short term goals: 1 week  Short term goal 1: Increase BUE strength to 4+/5 for transfers/ADLs/mobility. Short term goal 2: Perform ADL task in unsupported sitting for 20 min with supervision. Short term goal 3: Maintain standing balance on RLE with min A and DME for 1 min for transfers/repositioning. Short term goal 4: Pt/family will verbalize/demo: AE/DME options; LLE NWB precautions; recommended therapeutic activities; homemaking/IADL strategies; energy conservation techniques; and fall prevention strategies. Long term goals  Long term goal 1: Progress as tolerated.            BOLIVAR Monroe/L  Electronically signed by Marimar LUTZ/L on 10/14/2021 at 10:29 AM.

## 2021-10-14 NOTE — PROGRESS NOTES
Physician Progress Note      Ryne Christianson  [facility] #:                  [id number]  :                       1945  ADMIT DATE:       10/13/2021 2:12 AM  DISCH DATE:  RESPONDING  PROVIDER #:        Marylene Prudent        QUERY TEXT:    Stage of Chronic Kidney Disease: Please provide further specificity, if known. Clinical indicators include: ckd, chronic kidney disease, bun, creatinine, ckd   (chronic kidney disease  Options provided:  -- Chronic kidney disease stage 1  -- Chronic kidney disease stage 2  -- Chronic kidney disease stage 3  -- Chronic kidney disease stage 3a  -- Chronic kidney disease stage 3b  -- Chronic kidney disease stage 4  -- Chronic kidney disease stage 5  -- Chronic kidney disease stage 5, requiring dialysis  -- End stage renal disease  -- Other - I will add my own diagnosis  -- Disagree - Not applicable / Not valid  -- Disagree - Clinically Unable to determine / Unknown        PROVIDER RESPONSE TEXT:    The patient has chronic kidney disease stage 3b.       Electronically signed by:  Marylene Prudent 10/14/2021 10:32 AM

## 2021-10-15 LAB
EKG P AXIS: 120 DEGREES
EKG P-R INTERVAL: 146 MS
EKG Q-T INTERVAL: 470 MS
EKG QRS DURATION: 104 MS
EKG QTC CALCULATION (BAZETT): 482 MS
EKG T AXIS: 67 DEGREES
ORGANISM: ABNORMAL
URINE CULTURE, ROUTINE: ABNORMAL
URINE CULTURE, ROUTINE: ABNORMAL

## 2021-10-15 PROCEDURE — 93010 ELECTROCARDIOGRAM REPORT: CPT | Performed by: INTERNAL MEDICINE

## 2021-10-18 LAB
BLOOD CULTURE, ROUTINE: NORMAL
CULTURE, BLOOD 2: NORMAL

## (undated) DEVICE — C-ARMOR C-ARM EQUIPMENT COVERS CLEAR STERILE UNIVERSAL FIT 12 PER CASE: Brand: C-ARMOR

## (undated) DEVICE — GLOVE SURG SZ 85 CRM LTX FREE POLYISOPRENE POLYMER BEAD ANTI

## (undated) DEVICE — DRAPE,U/ SHT,SPLIT,PLAS,STERIL: Brand: MEDLINE

## (undated) DEVICE — GLOVE SURG SZ 8 L12IN FNGR THK79MIL GRN LTX FREE

## (undated) DEVICE — Device

## (undated) DEVICE — UNDERGLOVE SURG SZ 8 FNGR THK0.21MIL GRN LTX BEAD CUF

## (undated) DEVICE — SURGICAL PROCEDURE PACK LOWER EXTREMITY LOURDES HOSP

## (undated) DEVICE — BIT DRL L125MM DIA1.8MM QUIK CPL NONRADIOLUCENT W/O STP

## (undated) DEVICE — SUTURE ETHLN SZ 3-0 L18IN NONABSORBABLE BLK FS-1 L24MM 3/8 663H

## (undated) DEVICE — SOLUTION IV IRRIG POUR BRL 0.9% SODIUM CHL 2F7124

## (undated) DEVICE — GLOVE SURG SZ 85 L12IN FNGR THK79MIL GRN LTX FREE

## (undated) DEVICE — BIT DRL L100MM DIA2.4MM QUIK CPL W/O STP REUSE

## (undated) DEVICE — Z INACTIVE NO ACTIVE SUPPLIER APPLICATOR MEDICATED 26 CC TINT HI-LITE ORNG STRL CHLORAPREP

## (undated) DEVICE — C-ARM: Brand: UNBRANDED

## (undated) DEVICE — GUIDEWIRE ORTH L150MM DIA1.25MM S STL THRD FOR 4MM CANN SCR

## (undated) DEVICE — SUTURE ETHLN SZ 4-0 L18IN NONABSORBABLE BLK L19MM PS-2 3/8 1667H

## (undated) DEVICE — DRESSING PETRO W3XL8IN OIL EMUL N ADH GZ KNIT IMPREG CELOS

## (undated) DEVICE — Z DISCONTINUED USE 2272117 DRAPE SURG 3 QTR N INVASIVE 2 LAYR DISP

## (undated) DEVICE — TUBE ET 7.5MM NSL ORAL BASIC CUF INTMED MURPHY EYE RADPQ

## (undated) DEVICE — GLOVE SURG SZ 75 CRM LTX FREE POLYISOPRENE POLYMER BEAD ANTI

## (undated) DEVICE — BIT DRL L110MM DIA2.5MM G QUIK CPL W/O STP REUSE

## (undated) DEVICE — ZIMMER® STERILE DISPOSABLE TOURNIQUET CUFF WITH PLC, DUAL PORT, SINGLE BLADDER, 34 IN. (86 CM)

## (undated) DEVICE — BIT DRL L200MM DIA2.8MM CALIB L100MM FOR 3.5MM VA LCP PROX

## (undated) DEVICE — SOLUTION IRRIG 3000ML 0.9% SOD CHL USP UROMATIC PLAS CONT

## (undated) DEVICE — LARYNGOSCOPE BLDE MAC HNDL M SZ 35 ST CURAPLEX CURAVIEW LED

## (undated) DEVICE — 3M™ IOBAN™ 2 ANTIMICROBIAL INCISE DRAPE 6650EZ: Brand: IOBAN™ 2

## (undated) DEVICE — BIT DRL L160MM DIA2.7MM CANN QUIK CPL ADJ STP REUSE FOR